# Patient Record
Sex: MALE | Race: WHITE | NOT HISPANIC OR LATINO
[De-identification: names, ages, dates, MRNs, and addresses within clinical notes are randomized per-mention and may not be internally consistent; named-entity substitution may affect disease eponyms.]

---

## 2017-01-18 ENCOUNTER — MEDICATION RENEWAL (OUTPATIENT)
Age: 67
End: 2017-01-18

## 2017-02-22 ENCOUNTER — APPOINTMENT (OUTPATIENT)
Dept: OPHTHALMOLOGY | Facility: CLINIC | Age: 67
End: 2017-02-22

## 2017-08-17 ENCOUNTER — APPOINTMENT (OUTPATIENT)
Dept: UROLOGY | Facility: CLINIC | Age: 67
End: 2017-08-17
Payer: COMMERCIAL

## 2017-08-17 PROCEDURE — 99214 OFFICE O/P EST MOD 30 MIN: CPT

## 2017-08-18 LAB — PSA SERPL-MCNC: <0.01 NG/ML

## 2017-09-15 ENCOUNTER — RX RENEWAL (OUTPATIENT)
Age: 67
End: 2017-09-15

## 2017-10-27 ENCOUNTER — APPOINTMENT (OUTPATIENT)
Dept: GASTROENTEROLOGY | Facility: CLINIC | Age: 67
End: 2017-10-27
Payer: COMMERCIAL

## 2017-10-27 VITALS
TEMPERATURE: 97.2 F | HEIGHT: 66 IN | DIASTOLIC BLOOD PRESSURE: 70 MMHG | SYSTOLIC BLOOD PRESSURE: 120 MMHG | BODY MASS INDEX: 24.43 KG/M2 | WEIGHT: 152 LBS

## 2017-10-27 DIAGNOSIS — K59.00 CONSTIPATION, UNSPECIFIED: ICD-10-CM

## 2017-10-27 PROCEDURE — 99214 OFFICE O/P EST MOD 30 MIN: CPT

## 2017-11-28 ENCOUNTER — APPOINTMENT (OUTPATIENT)
Dept: INTERNAL MEDICINE | Facility: CLINIC | Age: 67
End: 2017-11-28
Payer: COMMERCIAL

## 2017-11-28 VITALS
HEIGHT: 65 IN | WEIGHT: 150 LBS | SYSTOLIC BLOOD PRESSURE: 104 MMHG | HEART RATE: 70 BPM | DIASTOLIC BLOOD PRESSURE: 66 MMHG | BODY MASS INDEX: 24.99 KG/M2 | OXYGEN SATURATION: 98 %

## 2017-11-28 DIAGNOSIS — Z82.0 FAMILY HISTORY OF EPILEPSY AND OTHER DISEASES OF THE NERVOUS SYSTEM: ICD-10-CM

## 2017-11-28 DIAGNOSIS — Z12.11 ENCOUNTER FOR SCREENING FOR MALIGNANT NEOPLASM OF COLON: ICD-10-CM

## 2017-11-28 DIAGNOSIS — Z81.8 FAMILY HISTORY OF OTHER MENTAL AND BEHAVIORAL DISORDERS: ICD-10-CM

## 2017-11-28 DIAGNOSIS — Z87.2 PERSONAL HISTORY OF DISEASES OF THE SKIN AND SUBCUTANEOUS TISSUE: ICD-10-CM

## 2017-11-28 DIAGNOSIS — M79.604 PAIN IN RIGHT LEG: ICD-10-CM

## 2017-11-28 DIAGNOSIS — L72.9 FOLLICULAR CYST OF THE SKIN AND SUBCUTANEOUS TISSUE, UNSPECIFIED: ICD-10-CM

## 2017-11-28 DIAGNOSIS — Z83.3 FAMILY HISTORY OF DIABETES MELLITUS: ICD-10-CM

## 2017-11-28 DIAGNOSIS — Z87.898 PERSONAL HISTORY OF OTHER SPECIFIED CONDITIONS: ICD-10-CM

## 2017-11-28 PROCEDURE — 99387 INIT PM E/M NEW PAT 65+ YRS: CPT | Mod: 25

## 2017-11-28 PROCEDURE — 90471 IMMUNIZATION ADMIN: CPT

## 2017-11-28 PROCEDURE — 90715 TDAP VACCINE 7 YRS/> IM: CPT

## 2017-11-28 RX ORDER — BACILLUS COAGULANS/INULIN 1B-250 MG
CAPSULE ORAL
Refills: 0 | Status: ACTIVE | COMMUNITY

## 2017-12-01 ENCOUNTER — MOBILE ON CALL (OUTPATIENT)
Age: 67
End: 2017-12-01

## 2017-12-04 ENCOUNTER — APPOINTMENT (OUTPATIENT)
Dept: INTERNAL MEDICINE | Facility: CLINIC | Age: 67
End: 2017-12-04
Payer: SELF-PAY

## 2017-12-04 DIAGNOSIS — Z71.89 OTHER SPECIFIED COUNSELING: ICD-10-CM

## 2017-12-04 PROCEDURE — 90632 HEPA VACCINE ADULT IM: CPT

## 2017-12-04 PROCEDURE — 90471 IMMUNIZATION ADMIN: CPT

## 2017-12-04 PROCEDURE — 99401 PREV MED CNSL INDIV APPRX 15: CPT | Mod: 25

## 2017-12-04 RX ORDER — AZITHROMYCIN 250 MG/1
250 TABLET, FILM COATED ORAL
Qty: 6 | Refills: 0 | Status: COMPLETED | COMMUNITY
Start: 2017-12-04 | End: 1900-01-01

## 2017-12-14 LAB
25(OH)D3 SERPL-MCNC: 22.8 NG/ML
ALBUMIN SERPL ELPH-MCNC: 4.6 G/DL
ALP BLD-CCNC: 46 U/L
ALT SERPL-CCNC: 30 U/L
ANION GAP SERPL CALC-SCNC: 13 MMOL/L
AST SERPL-CCNC: 35 U/L
BASOPHILS # BLD AUTO: 0.01 K/UL
BASOPHILS NFR BLD AUTO: 0.2 %
BILIRUB SERPL-MCNC: 0.5 MG/DL
BUN SERPL-MCNC: 16 MG/DL
CALCIUM SERPL-MCNC: 9.9 MG/DL
CHLORIDE SERPL-SCNC: 99 MMOL/L
CHOLEST SERPL-MCNC: 142 MG/DL
CHOLEST/HDLC SERPL: 2.7 RATIO
CO2 SERPL-SCNC: 26 MMOL/L
CREAT SERPL-MCNC: 1.18 MG/DL
EOSINOPHIL # BLD AUTO: 0.21 K/UL
EOSINOPHIL NFR BLD AUTO: 4.5
GLUCOSE SERPL-MCNC: 126 MG/DL
HBA1C MFR BLD HPLC: 5.6 %
HCT VFR BLD CALC: 44 %
HCV AB SER QL: NONREACTIVE
HCV S/CO RATIO: 0.13 S/CO
HDLC SERPL-MCNC: 52 MG/DL
HGB BLD-MCNC: 14.1 G/DL
IMM GRANULOCYTES NFR BLD AUTO: 0.2 %
LDLC SERPL CALC-MCNC: 77 MG/DL
LYMPHOCYTES # BLD AUTO: 1.12 K/UL
LYMPHOCYTES NFR BLD AUTO: 24 %
MAN DIFF?: NORMAL
MCHC RBC-ENTMCNC: 31.9 PG
MCHC RBC-ENTMCNC: 32 GM/DL
MCV RBC AUTO: 99.5 FL
MONOCYTES # BLD AUTO: 0.38 K/UL
MONOCYTES NFR BLD AUTO: 8.1 %
NEUTROPHILS # BLD AUTO: 2.94 K/UL
NEUTROPHILS NFR BLD AUTO: 63 %
PLATELET # BLD AUTO: 181 K/UL
POTASSIUM SERPL-SCNC: 4.8 MMOL/L
PROT SERPL-MCNC: 7.8 G/DL
RBC # BLD: 4.42 M/UL
RBC # FLD: 13.4 %
SODIUM SERPL-SCNC: 138 MMOL/L
TRIGL SERPL-MCNC: 66 MG/DL
WBC # FLD AUTO: 4.67 K/UL

## 2018-01-09 ENCOUNTER — RX RENEWAL (OUTPATIENT)
Age: 68
End: 2018-01-09

## 2018-01-10 ENCOUNTER — APPOINTMENT (OUTPATIENT)
Dept: GASTROENTEROLOGY | Facility: CLINIC | Age: 68
End: 2018-01-10

## 2018-03-08 ENCOUNTER — TRANSCRIPTION ENCOUNTER (OUTPATIENT)
Age: 68
End: 2018-03-08

## 2018-03-09 ENCOUNTER — TRANSCRIPTION ENCOUNTER (OUTPATIENT)
Age: 68
End: 2018-03-09

## 2018-03-12 ENCOUNTER — APPOINTMENT (OUTPATIENT)
Dept: GASTROENTEROLOGY | Facility: CLINIC | Age: 68
End: 2018-03-12
Payer: COMMERCIAL

## 2018-03-12 VITALS
TEMPERATURE: 97.5 F | HEIGHT: 65 IN | OXYGEN SATURATION: 97 % | HEART RATE: 63 BPM | WEIGHT: 153 LBS | DIASTOLIC BLOOD PRESSURE: 62 MMHG | RESPIRATION RATE: 14 BRPM | SYSTOLIC BLOOD PRESSURE: 101 MMHG | BODY MASS INDEX: 25.49 KG/M2

## 2018-03-12 PROCEDURE — 99203 OFFICE O/P NEW LOW 30 MIN: CPT

## 2018-03-22 ENCOUNTER — APPOINTMENT (OUTPATIENT)
Dept: INTERNAL MEDICINE | Facility: CLINIC | Age: 68
End: 2018-03-22
Payer: COMMERCIAL

## 2018-03-22 PROCEDURE — 90750 HZV VACC RECOMBINANT IM: CPT

## 2018-03-22 PROCEDURE — 90471 IMMUNIZATION ADMIN: CPT

## 2018-04-13 ENCOUNTER — APPOINTMENT (OUTPATIENT)
Dept: DERMATOLOGY | Facility: CLINIC | Age: 68
End: 2018-04-13
Payer: COMMERCIAL

## 2018-04-13 ENCOUNTER — MOBILE ON CALL (OUTPATIENT)
Age: 68
End: 2018-04-13

## 2018-04-13 VITALS — SYSTOLIC BLOOD PRESSURE: 120 MMHG | DIASTOLIC BLOOD PRESSURE: 76 MMHG

## 2018-04-13 PROCEDURE — 17000 DESTRUCT PREMALG LESION: CPT

## 2018-04-13 PROCEDURE — 99214 OFFICE O/P EST MOD 30 MIN: CPT | Mod: 25

## 2018-05-21 ENCOUNTER — CHART COPY (OUTPATIENT)
Age: 68
End: 2018-05-21

## 2018-05-22 ENCOUNTER — APPOINTMENT (OUTPATIENT)
Dept: GASTROENTEROLOGY | Facility: AMBULATORY MEDICAL SERVICES | Age: 68
End: 2018-05-22
Payer: COMMERCIAL

## 2018-05-22 PROCEDURE — 45385 COLONOSCOPY W/LESION REMOVAL: CPT

## 2018-05-29 ENCOUNTER — APPOINTMENT (OUTPATIENT)
Dept: INTERNAL MEDICINE | Facility: CLINIC | Age: 68
End: 2018-05-29
Payer: COMMERCIAL

## 2018-05-29 PROCEDURE — 90471 IMMUNIZATION ADMIN: CPT

## 2018-05-29 PROCEDURE — 90750 HZV VACC RECOMBINANT IM: CPT

## 2018-05-30 ENCOUNTER — RX RENEWAL (OUTPATIENT)
Age: 68
End: 2018-05-30

## 2018-06-08 ENCOUNTER — CHART COPY (OUTPATIENT)
Age: 68
End: 2018-06-08

## 2018-06-08 ENCOUNTER — APPOINTMENT (OUTPATIENT)
Dept: DERMATOLOGY | Facility: CLINIC | Age: 68
End: 2018-06-08
Payer: COMMERCIAL

## 2018-06-08 VITALS — SYSTOLIC BLOOD PRESSURE: 130 MMHG | DIASTOLIC BLOOD PRESSURE: 80 MMHG

## 2018-06-08 DIAGNOSIS — R22.9 LOCALIZED SWELLING, MASS AND LUMP, UNSPECIFIED: ICD-10-CM

## 2018-06-08 PROCEDURE — 99213 OFFICE O/P EST LOW 20 MIN: CPT

## 2018-07-10 ENCOUNTER — RX RENEWAL (OUTPATIENT)
Age: 68
End: 2018-07-10

## 2018-07-18 ENCOUNTER — MEDICATION RENEWAL (OUTPATIENT)
Age: 68
End: 2018-07-18

## 2018-07-27 PROBLEM — Z12.11 ENCOUNTER FOR SCREENING FOR MALIGNANT NEOPLASM OF COLON: Status: RESOLVED | Noted: 2017-10-27 | Resolved: 2018-07-27

## 2018-08-28 ENCOUNTER — APPOINTMENT (OUTPATIENT)
Dept: UROLOGY | Facility: CLINIC | Age: 68
End: 2018-08-28
Payer: COMMERCIAL

## 2018-08-28 PROCEDURE — 99214 OFFICE O/P EST MOD 30 MIN: CPT

## 2018-08-29 LAB — PSA SERPL-MCNC: <0.01 NG/ML

## 2018-09-21 ENCOUNTER — TRANSCRIPTION ENCOUNTER (OUTPATIENT)
Age: 68
End: 2018-09-21

## 2018-10-23 ENCOUNTER — APPOINTMENT (OUTPATIENT)
Dept: OPHTHALMOLOGY | Facility: CLINIC | Age: 68
End: 2018-10-23
Payer: COMMERCIAL

## 2018-10-23 PROCEDURE — 92014 COMPRE OPH EXAM EST PT 1/>: CPT

## 2018-12-07 ENCOUNTER — APPOINTMENT (OUTPATIENT)
Dept: INTERNAL MEDICINE | Facility: CLINIC | Age: 68
End: 2018-12-07
Payer: COMMERCIAL

## 2018-12-07 VITALS
SYSTOLIC BLOOD PRESSURE: 120 MMHG | HEIGHT: 65 IN | WEIGHT: 152 LBS | OXYGEN SATURATION: 98 % | HEART RATE: 72 BPM | DIASTOLIC BLOOD PRESSURE: 68 MMHG | BODY MASS INDEX: 25.33 KG/M2

## 2018-12-07 DIAGNOSIS — Z12.11 ENCOUNTER FOR SCREENING FOR MALIGNANT NEOPLASM OF COLON: ICD-10-CM

## 2018-12-07 PROCEDURE — 90471 IMMUNIZATION ADMIN: CPT

## 2018-12-07 PROCEDURE — 99397 PER PM REEVAL EST PAT 65+ YR: CPT | Mod: 25

## 2018-12-07 PROCEDURE — G0442 ANNUAL ALCOHOL SCREEN 15 MIN: CPT

## 2018-12-07 PROCEDURE — G0444 DEPRESSION SCREEN ANNUAL: CPT

## 2018-12-07 PROCEDURE — 90691 TYPHOID VACCINE IM: CPT

## 2018-12-07 RX ORDER — SODIUM SULFATE, POTASSIUM SULFATE, MAGNESIUM SULFATE 17.5; 3.13; 1.6 G/ML; G/ML; G/ML
17.5-3.13-1.6 SOLUTION, CONCENTRATE ORAL
Qty: 1 | Refills: 0 | Status: DISCONTINUED | COMMUNITY
Start: 2018-03-12 | End: 2018-12-07

## 2018-12-13 ENCOUNTER — MEDICATION RENEWAL (OUTPATIENT)
Age: 68
End: 2018-12-13

## 2018-12-22 ENCOUNTER — TRANSCRIPTION ENCOUNTER (OUTPATIENT)
Age: 68
End: 2018-12-22

## 2018-12-22 LAB
25(OH)D3 SERPL-MCNC: 55.2 NG/ML
ALBUMIN SERPL ELPH-MCNC: 4.9 G/DL
ALP BLD-CCNC: 45 U/L
ALT SERPL-CCNC: 27 U/L
ANION GAP SERPL CALC-SCNC: 11 MMOL/L
AST SERPL-CCNC: 27 U/L
BASOPHILS # BLD AUTO: 0.02 K/UL
BASOPHILS NFR BLD AUTO: 0.4 %
BILIRUB SERPL-MCNC: 0.4 MG/DL
BUN SERPL-MCNC: 16 MG/DL
CALCIUM SERPL-MCNC: 9.6 MG/DL
CHLORIDE SERPL-SCNC: 103 MMOL/L
CHOLEST SERPL-MCNC: 131 MG/DL
CHOLEST/HDLC SERPL: 2.8 RATIO
CO2 SERPL-SCNC: 27 MMOL/L
CREAT SERPL-MCNC: 1.1 MG/DL
EOSINOPHIL # BLD AUTO: 0.18 K/UL
EOSINOPHIL NFR BLD AUTO: 3.2 %
GLUCOSE SERPL-MCNC: 103 MG/DL
HBA1C MFR BLD HPLC: 5.7 %
HBV SURFACE AB SER QL: ABNORMAL
HCT VFR BLD CALC: 41.7 %
HDLC SERPL-MCNC: 47 MG/DL
HGB BLD-MCNC: 13.9 G/DL
IMM GRANULOCYTES NFR BLD AUTO: 0 %
LDLC SERPL CALC-MCNC: 71 MG/DL
LYMPHOCYTES # BLD AUTO: 1.12 K/UL
LYMPHOCYTES NFR BLD AUTO: 19.9 %
MAN DIFF?: NORMAL
MCHC RBC-ENTMCNC: 31.7 PG
MCHC RBC-ENTMCNC: 33.3 GM/DL
MCV RBC AUTO: 95.2 FL
MONOCYTES # BLD AUTO: 0.44 K/UL
MONOCYTES NFR BLD AUTO: 7.8 %
NEUTROPHILS # BLD AUTO: 3.87 K/UL
NEUTROPHILS NFR BLD AUTO: 68.7 %
PLATELET # BLD AUTO: 202 K/UL
POTASSIUM SERPL-SCNC: 4.8 MMOL/L
PROT SERPL-MCNC: 7.3 G/DL
RBC # BLD: 4.38 M/UL
RBC # FLD: 13.2 %
SODIUM SERPL-SCNC: 141 MMOL/L
TRIGL SERPL-MCNC: 63 MG/DL
WBC # FLD AUTO: 5.63 K/UL

## 2018-12-22 NOTE — ASSESSMENT
[FreeTextEntry1] : 69 yo man with h/o as above including hyperlipidemia, prostate cancer s/p prostatectomy and radiation, preDM, constipation, here for CPE.\par 1.  CV - bp at goal, check lipids\par 2.  Endo - check hgbA1c for preDM\par 3.   - follows with  for h/o prostate cancer; to try to change formulation back of tadalafil but if no improvement will discuss with ; check UA given urinary symptoms\par 4.  GI - colonoscopy utd\par 5.  ID - typhoid vaccine given as due for upcoming travel; check hep B titer to make sure immune\par 6.  HCM - check other below labs; vaccines otherwise utd\par 7.  RTO prn or 1 year

## 2018-12-22 NOTE — REVIEW OF SYSTEMS
[Negative] : Musculoskeletal [Fever] : no fever [Chills] : no chills [Fatigue] : no fatigue [Recent Change In Weight] : ~T no recent weight change [Chest Pain] : no chest pain [Palpitations] : no palpitations [Shortness Of Breath] : no shortness of breath [Cough] : no cough [Abdominal Pain] : no abdominal pain [Nausea] : no nausea [Constipation] : no constipation [Diarrhea] : no diarrhea [Vomiting] : no vomiting [Heartburn] : no heartburn [Melena] : no melena [Dysuria] : no dysuria [Nocturia] : no nocturia [Skin Rash] : no skin rash [Dizziness] : no dizziness [Fainting] : no fainting [Anxiety] : no anxiety [Depression] : no depression [Easy Bleeding] : no easy bleeding [Easy Bruising] : no easy bruising [Swollen Glands] : no swollen glands [FreeTextEntry3] : sees optho [FreeTextEntry6] : few weeks ago couldn't take deep breath, went away [FreeTextEntry8] : nocturia better than it has been, slight pain with urination at times, transient [FreeTextEntry9] : muscle aches with seeing

## 2018-12-22 NOTE — PHYSICAL EXAM
[No Acute Distress] : no acute distress [Well Nourished] : well nourished [Well Developed] : well developed [Well-Appearing] : well-appearing [PERRL] : pupils equal round and reactive to light [Normal Oropharynx] : the oropharynx was normal [Normal TMs] : both tympanic membranes were normal [Normal Nasal Mucosa] : the nasal mucosa was normal [Supple] : supple [No Lymphadenopathy] : no lymphadenopathy [Thyroid Normal, No Nodules] : the thyroid was normal and there were no nodules present [No Respiratory Distress] : no respiratory distress  [Clear to Auscultation] : lungs were clear to auscultation bilaterally [No Accessory Muscle Use] : no accessory muscle use [Normal Rate] : normal rate  [Regular Rhythm] : with a regular rhythm [Normal S1, S2] : normal S1 and S2 [No Murmur] : no murmur heard [No Carotid Bruits] : no carotid bruits [Pedal Pulses Present] : the pedal pulses are present [No Edema] : there was no peripheral edema [Normal Appearance] : normal in appearance [Soft] : abdomen soft [Non Tender] : non-tender [Non-distended] : non-distended [No Masses] : no abdominal mass palpated [No HSM] : no HSM [Normal Bowel Sounds] : normal bowel sounds [Normal Supraclavicular Nodes] : no supraclavicular lymphadenopathy [Normal Posterior Cervical Nodes] : no posterior cervical lymphadenopathy [Normal Anterior Cervical Nodes] : no anterior cervical lymphadenopathy [No Joint Swelling] : no joint swelling [No Rash] : no rash [Normal Gait] : normal gait [Normal Affect] : the affect was normal [de-identified] : done by NAILA

## 2018-12-22 NOTE — HISTORY OF PRESENT ILLNESS
[FreeTextEntry1] : physical [de-identified] : 67 yo man with h/o as below here for CPE.\par Feeling well overall, no complaints.\par Will be traveling to [facility] in 2 weeks.\par Notes tadalafil not as effective as was previously, has had difficulty with erections over past few weeks.  Used to get medication in pill form from [facility], then switched to pharmacy in  and in chewable tablet form.\par No other active issues.

## 2018-12-22 NOTE — ADDENDUM
[FreeTextEntry1] : 12/22/18:  Reviewed labs, nl except hgbA1c 5.7 c/w preDM (to work on diet, stable), hep B indeterminate (consider repeating), UA unable to be processed (advised if persistent urinary symptoms to call back and we would send another urine sample) - messaged to pt.

## 2019-01-09 ENCOUNTER — RX RENEWAL (OUTPATIENT)
Age: 69
End: 2019-01-09

## 2019-01-10 ENCOUNTER — RX RENEWAL (OUTPATIENT)
Age: 69
End: 2019-01-10

## 2019-01-16 ENCOUNTER — TRANSCRIPTION ENCOUNTER (OUTPATIENT)
Age: 69
End: 2019-01-16

## 2019-01-22 ENCOUNTER — TRANSCRIPTION ENCOUNTER (OUTPATIENT)
Age: 69
End: 2019-01-22

## 2019-03-06 ENCOUNTER — APPOINTMENT (OUTPATIENT)
Dept: UROLOGY | Facility: CLINIC | Age: 69
End: 2019-03-06
Payer: COMMERCIAL

## 2019-03-06 VITALS
SYSTOLIC BLOOD PRESSURE: 141 MMHG | OXYGEN SATURATION: 97 % | HEIGHT: 65 IN | TEMPERATURE: 98 F | BODY MASS INDEX: 25.33 KG/M2 | DIASTOLIC BLOOD PRESSURE: 74 MMHG | WEIGHT: 152 LBS | HEART RATE: 75 BPM

## 2019-03-06 PROCEDURE — 99212 OFFICE O/P EST SF 10 MIN: CPT

## 2019-03-06 NOTE — HISTORY OF PRESENT ILLNESS
[FreeTextEntry1] : CC: ED\par \par Patient with ED, was doing well with generic 5 mg daily tadalafil.  Switched supplier and less effective, then went back to the original and not as effective as used to be. \par \par We discussed taking 20 mg prior to intercourse, and then next day holding the 5mg, then resuming 5 mg daily, and using 20 in this manner when needed. \par \par Other options discussed in detail

## 2019-03-07 ENCOUNTER — TRANSCRIPTION ENCOUNTER (OUTPATIENT)
Age: 69
End: 2019-03-07

## 2019-03-07 LAB — PSA SERPL-MCNC: <0.01 NG/ML

## 2019-04-05 ENCOUNTER — APPOINTMENT (OUTPATIENT)
Dept: DERMATOLOGY | Facility: CLINIC | Age: 69
End: 2019-04-05
Payer: COMMERCIAL

## 2019-04-05 PROCEDURE — 17003 DESTRUCT PREMALG LES 2-14: CPT

## 2019-04-05 PROCEDURE — 17000 DESTRUCT PREMALG LESION: CPT

## 2019-04-05 PROCEDURE — 99214 OFFICE O/P EST MOD 30 MIN: CPT | Mod: 25

## 2019-05-28 ENCOUNTER — RX RENEWAL (OUTPATIENT)
Age: 69
End: 2019-05-28

## 2019-06-23 ENCOUNTER — TRANSCRIPTION ENCOUNTER (OUTPATIENT)
Age: 69
End: 2019-06-23

## 2019-07-08 ENCOUNTER — RX RENEWAL (OUTPATIENT)
Age: 69
End: 2019-07-08

## 2019-07-17 ENCOUNTER — APPOINTMENT (OUTPATIENT)
Dept: OPHTHALMOLOGY | Facility: CLINIC | Age: 69
End: 2019-07-17
Payer: SELF-PAY

## 2019-07-17 ENCOUNTER — NON-APPOINTMENT (OUTPATIENT)
Age: 69
End: 2019-07-17

## 2019-07-17 PROCEDURE — 92015 DETERMINE REFRACTIVE STATE: CPT

## 2019-08-26 ENCOUNTER — APPOINTMENT (OUTPATIENT)
Dept: DERMATOLOGY | Facility: CLINIC | Age: 69
End: 2019-08-26
Payer: COMMERCIAL

## 2019-08-26 PROCEDURE — 99213 OFFICE O/P EST LOW 20 MIN: CPT

## 2019-09-13 ENCOUNTER — APPOINTMENT (OUTPATIENT)
Dept: UROLOGY | Facility: CLINIC | Age: 69
End: 2019-09-13

## 2019-09-17 ENCOUNTER — LABORATORY RESULT (OUTPATIENT)
Age: 69
End: 2019-09-17

## 2019-09-18 ENCOUNTER — APPOINTMENT (OUTPATIENT)
Dept: UROLOGY | Facility: CLINIC | Age: 69
End: 2019-09-18
Payer: COMMERCIAL

## 2019-09-18 PROCEDURE — 99214 OFFICE O/P EST MOD 30 MIN: CPT

## 2019-09-18 NOTE — HISTORY OF PRESENT ILLNESS
[FreeTextEntry1] : CC: personal history of CaP, LUTS, urethral stricture, ED\par \par History of RRP in 2003 in [place] with Dr. Hutson, then BCR and XRT in 2008.  He has a history of radiation cystitis, bulbar urethral stricture, ED.   \par Currently doing well.  No pain.  Trace occasional ONOFRE.  Excellent erections with 5 mg tadalafil, sometimes needing 10mg.  No other issues.  No hematuria. \par \par SURG: RRP \par FAMHX: negative for CaP\par SOCIAL nonsmoker\par ROS: negative CV,RESP,GI

## 2019-09-18 NOTE — PHYSICAL EXAM
[General Appearance - Well Developed] : well developed [General Appearance - Well Nourished] : well nourished [Normal Appearance] : normal appearance [Well Groomed] : well groomed [General Appearance - In No Acute Distress] : no acute distress [Abdomen Soft] : soft [Abdomen Tenderness] : non-tender [Costovertebral Angle Tenderness] : no ~M costovertebral angle tenderness [Urethral Meatus] : meatus normal [Urinary Bladder Findings] : the bladder was normal on palpation [Penis Abnormality] : normal circumcised penis [Epididymis] : the epididymides were normal [Scrotum] : the scrotum was normal [Testes Tenderness] : no tenderness of the testes [Testes Mass (___cm)] : there were no testicular masses [Edema] : no peripheral edema [Respiration, Rhythm And Depth] : normal respiratory rhythm and effort [] : no respiratory distress [Exaggerated Use Of Accessory Muscles For Inspiration] : no accessory muscle use [No Focal Deficits] : no focal deficits [No Palpable Adenopathy] : no palpable adenopathy

## 2019-10-30 ENCOUNTER — NON-APPOINTMENT (OUTPATIENT)
Age: 69
End: 2019-10-30

## 2019-10-30 ENCOUNTER — APPOINTMENT (OUTPATIENT)
Dept: OPHTHALMOLOGY | Facility: CLINIC | Age: 69
End: 2019-10-30
Payer: COMMERCIAL

## 2019-10-30 PROCEDURE — 92014 COMPRE OPH EXAM EST PT 1/>: CPT

## 2020-01-14 ENCOUNTER — APPOINTMENT (OUTPATIENT)
Dept: INTERNAL MEDICINE | Facility: CLINIC | Age: 70
End: 2020-01-14
Payer: COMMERCIAL

## 2020-01-14 VITALS
BODY MASS INDEX: 24.66 KG/M2 | HEIGHT: 65 IN | DIASTOLIC BLOOD PRESSURE: 70 MMHG | OXYGEN SATURATION: 90 % | SYSTOLIC BLOOD PRESSURE: 130 MMHG | WEIGHT: 148 LBS | HEART RATE: 60 BPM

## 2020-01-14 DIAGNOSIS — Z86.018 PERSONAL HISTORY OF OTHER BENIGN NEOPLASM: ICD-10-CM

## 2020-01-14 DIAGNOSIS — H02.889 MEIBOMIAN GLAND DYSFUNCTION OF UNSPECIFIED EYE, UNSPECIFIED EYELID: ICD-10-CM

## 2020-01-14 DIAGNOSIS — Z71.89 OTHER SPECIFIED COUNSELING: ICD-10-CM

## 2020-01-14 PROCEDURE — 99397 PER PM REEVAL EST PAT 65+ YR: CPT

## 2020-01-14 RX ORDER — TADALAFIL 5 MG/1
5 TABLET ORAL
Qty: 90 | Refills: 0 | Status: DISCONTINUED | COMMUNITY
Start: 2019-09-18 | End: 2020-01-14

## 2020-01-14 RX ORDER — FLUOROURACIL 50 MG/G
5 CREAM TOPICAL
Qty: 1 | Refills: 1 | Status: DISCONTINUED | COMMUNITY
Start: 2019-04-05 | End: 2020-01-14

## 2020-01-14 RX ORDER — SOLIFENACIN SUCCINATE 10 MG/1
10 TABLET ORAL
Qty: 30 | Refills: 5 | Status: DISCONTINUED | COMMUNITY
Start: 2019-07-08 | End: 2020-01-14

## 2020-01-22 ENCOUNTER — TRANSCRIPTION ENCOUNTER (OUTPATIENT)
Age: 70
End: 2020-01-22

## 2020-01-22 LAB
25(OH)D3 SERPL-MCNC: 40.6 NG/ML
ALBUMIN SERPL ELPH-MCNC: 5.1 G/DL
ALP BLD-CCNC: 55 U/L
ALT SERPL-CCNC: 26 U/L
ANION GAP SERPL CALC-SCNC: 10 MMOL/L
AST SERPL-CCNC: 30 U/L
BASOPHILS # BLD AUTO: 0.02 K/UL
BASOPHILS NFR BLD AUTO: 0.4 %
BILIRUB SERPL-MCNC: 0.5 MG/DL
BUN SERPL-MCNC: 12 MG/DL
CALCIUM SERPL-MCNC: 10.2 MG/DL
CHLORIDE SERPL-SCNC: 101 MMOL/L
CHOLEST SERPL-MCNC: 142 MG/DL
CHOLEST/HDLC SERPL: 2.6 RATIO
CO2 SERPL-SCNC: 29 MMOL/L
CREAT SERPL-MCNC: 1.04 MG/DL
EOSINOPHIL # BLD AUTO: 0.13 K/UL
EOSINOPHIL NFR BLD AUTO: 2.5 %
ESTIMATED AVERAGE GLUCOSE: 108 MG/DL
GLUCOSE SERPL-MCNC: 92 MG/DL
HBA1C MFR BLD HPLC: 5.4 %
HCT VFR BLD CALC: 44.4 %
HDLC SERPL-MCNC: 54 MG/DL
HGB BLD-MCNC: 14.4 G/DL
IMM GRANULOCYTES NFR BLD AUTO: 0.6 %
LDLC SERPL CALC-MCNC: 76 MG/DL
LYMPHOCYTES # BLD AUTO: 1.09 K/UL
LYMPHOCYTES NFR BLD AUTO: 20.8 %
MAN DIFF?: NORMAL
MCHC RBC-ENTMCNC: 31.5 PG
MCHC RBC-ENTMCNC: 32.4 GM/DL
MCV RBC AUTO: 97.2 FL
MONOCYTES # BLD AUTO: 0.43 K/UL
MONOCYTES NFR BLD AUTO: 8.2 %
NEUTROPHILS # BLD AUTO: 3.55 K/UL
NEUTROPHILS NFR BLD AUTO: 67.5 %
PLATELET # BLD AUTO: 172 K/UL
POTASSIUM SERPL-SCNC: 4.7 MMOL/L
PROT SERPL-MCNC: 7.2 G/DL
RBC # BLD: 4.57 M/UL
RBC # FLD: 13.2 %
SODIUM SERPL-SCNC: 140 MMOL/L
TRIGL SERPL-MCNC: 61 MG/DL
WBC # FLD AUTO: 5.25 K/UL

## 2020-01-22 NOTE — PHYSICAL EXAM
[No Acute Distress] : no acute distress [Well Nourished] : well nourished [Well-Appearing] : well-appearing [Well Developed] : well developed [PERRL] : pupils equal round and reactive to light [Normal Oropharynx] : the oropharynx was normal [No Lymphadenopathy] : no lymphadenopathy [Normal TMs] : both tympanic membranes were normal [Thyroid Normal, No Nodules] : the thyroid was normal and there were no nodules present [Supple] : supple [No Respiratory Distress] : no respiratory distress  [No Accessory Muscle Use] : no accessory muscle use [Clear to Auscultation] : lungs were clear to auscultation bilaterally [Normal Rate] : normal rate  [Normal S1, S2] : normal S1 and S2 [Regular Rhythm] : with a regular rhythm [No Carotid Bruits] : no carotid bruits [No Murmur] : no murmur heard [Pedal Pulses Present] : the pedal pulses are present [No Edema] : there was no peripheral edema [Soft] : abdomen soft [Normal Appearance] : normal in appearance [Non-distended] : non-distended [Non Tender] : non-tender [No HSM] : no HSM [Normal Bowel Sounds] : normal bowel sounds [No Masses] : no abdominal mass palpated [Normal Posterior Cervical Nodes] : no posterior cervical lymphadenopathy [Normal Supraclavicular Nodes] : no supraclavicular lymphadenopathy [Normal Anterior Cervical Nodes] : no anterior cervical lymphadenopathy [No Joint Swelling] : no joint swelling [No Rash] : no rash [Normal Gait] : normal gait [Normal Affect] : the affect was normal [de-identified] : crowded posterior oropharynx [de-identified] : done by NAILA

## 2020-01-22 NOTE — ASSESSMENT
[FreeTextEntry1] : 70 yo man with h/o as above including hyperlipidemia, preDM, prostate cancer s/p prostatectomy, ED, here for CPE.\par 1.  CV - bp at goal, check lipids\par 2.  ENT - reported snoring, trial flonase, breathe-right strips, changing sleep position, if continues or if waking up unrefreshed consider eval for HANNY\par 3.  GI - colonoscopy utd, due next year\par 4.   - following with  following prostate cancer hx, to discuss ED treatment with  (notes sometimes higher prn dosing works better, pt will ask  how to transition from regular use to prn use intermittently)\par 5.  HCM - check below labs; vaccines utd\par 6.  RTO prn or 1 year

## 2020-01-22 NOTE — HISTORY OF PRESENT ILLNESS
[FreeTextEntry1] : physical [de-identified] : 68 yo man with h/o as below here for CPE.\par Would like to go back and see dermatologist again, skin is pruritic and had precancerous lesions.\par Sometimes snores per wife.  \par Has dropped some weight, lost about 8 lbs, then gained 4 lbs back.  Has been exercising, might not have been eating as much.  Will have fruit without oatmeal in the morning (used to have oatmeal in the morning), now walking 2 miles/day as well to get to work.  Also sees  twice/week, does elliptical as well.\par No other active issues.

## 2020-01-22 NOTE — REVIEW OF SYSTEMS
[Recent Change In Weight] : ~T recent weight change [Impotence] : impotence [Negative] : Musculoskeletal [Fever] : no fever [Chills] : no chills [Fatigue] : no fatigue [Chest Pain] : no chest pain [Palpitations] : no palpitations [Shortness Of Breath] : no shortness of breath [Cough] : no cough [Dyspnea on Exertion] : not dyspnea on exertion [Abdominal Pain] : no abdominal pain [Nausea] : no nausea [Constipation] : no constipation [Diarrhea] : no diarrhea [Vomiting] : no vomiting [Heartburn] : no heartburn [Melena] : no melena [Dysuria] : no dysuria [Nocturia] : no nocturia [Dizziness] : no dizziness [Fainting] : no fainting [Anxiety] : no anxiety [Depression] : no depression [Easy Bleeding] : no easy bleeding [Easy Bruising] : no easy bruising [Swollen Glands] : no swollen glands [FreeTextEntry3] : sees optho [FreeTextEntry7] : slight bleeding with bowel movement (with wiping) [FreeTextEntry8] : nocturia once/night [de-identified] : see hpi

## 2020-05-21 ENCOUNTER — TRANSCRIPTION ENCOUNTER (OUTPATIENT)
Age: 70
End: 2020-05-21

## 2020-05-29 ENCOUNTER — RX RENEWAL (OUTPATIENT)
Age: 70
End: 2020-05-29

## 2020-08-14 ENCOUNTER — APPOINTMENT (OUTPATIENT)
Dept: DERMATOLOGY | Facility: CLINIC | Age: 70
End: 2020-08-14

## 2020-08-17 ENCOUNTER — APPOINTMENT (OUTPATIENT)
Dept: DERMATOLOGY | Facility: CLINIC | Age: 70
End: 2020-08-17
Payer: COMMERCIAL

## 2020-08-17 VITALS — WEIGHT: 149 LBS | BODY MASS INDEX: 24.83 KG/M2 | HEIGHT: 65 IN

## 2020-08-17 VITALS — TEMPERATURE: 97.7 F

## 2020-08-17 PROCEDURE — 17000 DESTRUCT PREMALG LESION: CPT

## 2020-08-17 PROCEDURE — 99213 OFFICE O/P EST LOW 20 MIN: CPT | Mod: 25

## 2020-08-17 PROCEDURE — 17003 DESTRUCT PREMALG LES 2-14: CPT

## 2020-08-31 ENCOUNTER — APPOINTMENT (OUTPATIENT)
Dept: GASTROENTEROLOGY | Facility: CLINIC | Age: 70
End: 2020-08-31
Payer: COMMERCIAL

## 2020-08-31 DIAGNOSIS — Z87.19 PERSONAL HISTORY OF OTHER DISEASES OF THE DIGESTIVE SYSTEM: ICD-10-CM

## 2020-08-31 PROCEDURE — 99214 OFFICE O/P EST MOD 30 MIN: CPT | Mod: 95

## 2020-08-31 NOTE — ASSESSMENT
[FreeTextEntry1] : This is a 70-year-old man status post radiation therapy over 10 years ago for prostate cancer now with rectal bleeding most likely due to worsening radiation proctitis. He also has a history of adenomatous colon polyp at colonoscopy 2 years ago. I explained to him at length the meaning of radiation proctitis and available treatment for radiation proctitis which includes colonoscopy with argon plasma coagulation. I explained to him the risks, alternatives and benefits of the procedure. Risk including but not limited to bleeding, perforation, infection adverse medication reaction. Multiple questions were answered. He stated understanding.

## 2020-08-31 NOTE — HISTORY OF PRESENT ILLNESS
[Home] : at home, [unfilled] , at the time of the visit. [Other Location: e.g. Home (Enter Location, City,State)___] : at [unfilled] [Verbal consent obtained from patient] : the patient, [unfilled] [FreeTextEntry1] : This is a 70-year-old man with history of prostate cancer status post radiation over 10 years ago reporting more frequent rectal bleeding consisting of bright blood blood per rectum. He now notices blood stains in his underwear and clothing. He denies rectal pain or discomfort. He denies constipation, having hard stools with straining. No abdominal pain, nausea or vomiting. He takes probiotics with improvement in bowel movements in that he has "good" bowel movements and a daily basis. He denies weight loss. Review recent blood work showing no evidence of anemia. He underwent a colonoscopy in May 2018 with a sessile serrated adenoma and mild radiation proctitis. He was also recently found to have mediations cystitis.

## 2020-09-28 ENCOUNTER — APPOINTMENT (OUTPATIENT)
Dept: DISASTER EMERGENCY | Facility: CLINIC | Age: 70
End: 2020-09-28

## 2020-09-29 ENCOUNTER — APPOINTMENT (OUTPATIENT)
Dept: UROLOGY | Facility: CLINIC | Age: 70
End: 2020-09-29

## 2020-09-29 VITALS — TEMPERATURE: 97.9 F | SYSTOLIC BLOOD PRESSURE: 123 MMHG | DIASTOLIC BLOOD PRESSURE: 66 MMHG

## 2020-09-29 LAB — SARS-COV-2 N GENE NPH QL NAA+PROBE: NOT DETECTED

## 2020-10-01 ENCOUNTER — APPOINTMENT (OUTPATIENT)
Dept: GASTROENTEROLOGY | Facility: HOSPITAL | Age: 70
End: 2020-10-01

## 2020-10-01 ENCOUNTER — OUTPATIENT (OUTPATIENT)
Dept: OUTPATIENT SERVICES | Facility: HOSPITAL | Age: 70
LOS: 1 days | End: 2020-10-01
Payer: COMMERCIAL

## 2020-10-01 ENCOUNTER — RESULT REVIEW (OUTPATIENT)
Age: 70
End: 2020-10-01

## 2020-10-01 VITALS
WEIGHT: 134.92 LBS | TEMPERATURE: 97 F | HEIGHT: 65 IN | SYSTOLIC BLOOD PRESSURE: 129 MMHG | DIASTOLIC BLOOD PRESSURE: 67 MMHG | RESPIRATION RATE: 11 BRPM | HEART RATE: 64 BPM

## 2020-10-01 VITALS
DIASTOLIC BLOOD PRESSURE: 68 MMHG | SYSTOLIC BLOOD PRESSURE: 118 MMHG | HEART RATE: 56 BPM | RESPIRATION RATE: 12 BRPM | OXYGEN SATURATION: 99 %

## 2020-10-01 DIAGNOSIS — Z98.89 OTHER SPECIFIED POSTPROCEDURAL STATES: Chronic | ICD-10-CM

## 2020-10-01 DIAGNOSIS — K62.5 HEMORRHAGE OF ANUS AND RECTUM: ICD-10-CM

## 2020-10-01 DIAGNOSIS — K62.7 RADIATION PROCTITIS: ICD-10-CM

## 2020-10-01 DIAGNOSIS — T14.8 OTHER INJURY OF UNSPECIFIED BODY REGION: Chronic | ICD-10-CM

## 2020-10-01 PROCEDURE — 88305 TISSUE EXAM BY PATHOLOGIST: CPT | Mod: 26

## 2020-10-01 PROCEDURE — 45385 COLONOSCOPY W/LESION REMOVAL: CPT | Mod: GC

## 2020-10-01 PROCEDURE — 88305 TISSUE EXAM BY PATHOLOGIST: CPT

## 2020-10-01 PROCEDURE — 45385 COLONOSCOPY W/LESION REMOVAL: CPT

## 2020-10-01 PROCEDURE — 45382 COLONOSCOPY W/CONTROL BLEED: CPT | Mod: GC,59

## 2020-10-01 RX ORDER — SODIUM CHLORIDE 9 MG/ML
1000 INJECTION INTRAMUSCULAR; INTRAVENOUS; SUBCUTANEOUS
Refills: 0 | Status: DISCONTINUED | OUTPATIENT
Start: 2020-10-01 | End: 2020-10-15

## 2020-10-01 RX ORDER — CHOLECALCIFEROL (VITAMIN D3) 125 MCG
1 CAPSULE ORAL
Qty: 0 | Refills: 0 | DISCHARGE

## 2020-10-01 NOTE — PRE-ANESTHESIA EVALUATION ADULT - NSANTHOSAYNRD_GEN_A_CORE
No. HANNY screening performed.  STOP BANG Legend: 0-2 = LOW Risk; 3-4 = INTERMEDIATE Risk; 5-8 = HIGH Risk

## 2020-10-01 NOTE — PRE PROCEDURE NOTE - PRE PROCEDURE EVALUATION
Attending Physician:             Radha Baldwin               Procedure: colonoscopy    Indication for Procedure: rectal bleeding  ________________________________________________________  PAST MEDICAL & SURGICAL HISTORY:  ED (erectile dysfunction)  cialis daily    Syncope  1993  related to stress    Prostate cancer  s/p prostatectomy 2003/ radiation 2008    Hyperlipidemia    Urethral stricture    S/P hernia repair  left with mesh 1993    S/P prostatectomy  2003    Fracture  right cheekbone s/p orif  1978      ALLERGIES:  No Known Allergies    HOME MEDICATIONS:  atorvastatin 10 mg oral tablet: 1 tab(s) orally once a day  Percocet 5/325 325 mg-5 mg oral tablet: 1 tab(s) orally every 4 hours, As Needed  tadalafil 5 mg oral tablet: 1 tab(s) orally once a day  Zenpep 25,000 units-136,000 units-85,000 units oral delayed release capsule: 1 cap(s) orally 3 times a day    AICD/PPM: [ ] yes   [X ] no    PERTINENT LAB DATA:                      PHYSICAL EXAMINATION:    T(C): --  HR: --  BP: --  RR: --  SpO2: --    Constitutional: NAD  HEENT: PERRLA, EOMI,    Neck:  No JVD  Respiratory: CTAB/L  Cardiovascular: S1 and S2  Gastrointestinal: BS+, soft, NT/ND  Extremities: No peripheral edema  Neurological: A/O x 3, no focal deficits  Psychiatric: Normal mood, normal affect  Skin: No rashes    ASA Class: I [ ]  II [ ]  III [ ]  IV [ ]    COMMENTS:    The patient is a suitable candidate for the planned procedure unless box checked [ ]  No, explain:

## 2020-10-01 NOTE — ASU PATIENT PROFILE, ADULT - PSH
Fracture  right cheekbone s/p orif  1978  S/P hernia repair  left with mesh 1993  S/P prostatectomy  2003

## 2020-10-01 NOTE — ASU PATIENT PROFILE, ADULT - PMH
ED (erectile dysfunction)  cialis daily  Hyperlipidemia    Prostate cancer  s/p prostatectomy 2003/ radiation 2008  Syncope  1993  related to stress  Urethral stricture

## 2020-10-02 LAB — SURGICAL PATHOLOGY STUDY: SIGNIFICANT CHANGE UP

## 2020-10-06 LAB
PSA FREE FLD-MCNC: NORMAL %
PSA FREE SERPL-MCNC: <0.01 NG/ML
PSA SERPL-MCNC: <0.01 NG/ML

## 2020-11-20 ENCOUNTER — TRANSCRIPTION ENCOUNTER (OUTPATIENT)
Age: 70
End: 2020-11-20

## 2020-12-01 ENCOUNTER — TRANSCRIPTION ENCOUNTER (OUTPATIENT)
Age: 70
End: 2020-12-01

## 2020-12-21 ENCOUNTER — TRANSCRIPTION ENCOUNTER (OUTPATIENT)
Age: 70
End: 2020-12-21

## 2021-01-03 ENCOUNTER — TRANSCRIPTION ENCOUNTER (OUTPATIENT)
Age: 71
End: 2021-01-03

## 2021-01-05 ENCOUNTER — TRANSCRIPTION ENCOUNTER (OUTPATIENT)
Age: 71
End: 2021-01-05

## 2021-01-12 ENCOUNTER — TRANSCRIPTION ENCOUNTER (OUTPATIENT)
Age: 71
End: 2021-01-12

## 2021-01-14 ENCOUNTER — TRANSCRIPTION ENCOUNTER (OUTPATIENT)
Age: 71
End: 2021-01-14

## 2021-01-25 ENCOUNTER — TRANSCRIPTION ENCOUNTER (OUTPATIENT)
Age: 71
End: 2021-01-25

## 2021-01-26 ENCOUNTER — TRANSCRIPTION ENCOUNTER (OUTPATIENT)
Age: 71
End: 2021-01-26

## 2021-01-28 ENCOUNTER — TRANSCRIPTION ENCOUNTER (OUTPATIENT)
Age: 71
End: 2021-01-28

## 2021-01-29 ENCOUNTER — TRANSCRIPTION ENCOUNTER (OUTPATIENT)
Age: 71
End: 2021-01-29

## 2021-01-29 ENCOUNTER — NON-APPOINTMENT (OUTPATIENT)
Age: 71
End: 2021-01-29

## 2021-02-02 ENCOUNTER — NON-APPOINTMENT (OUTPATIENT)
Age: 71
End: 2021-02-02

## 2021-02-03 ENCOUNTER — APPOINTMENT (OUTPATIENT)
Dept: INTERNAL MEDICINE | Facility: CLINIC | Age: 71
End: 2021-02-03
Payer: COMMERCIAL

## 2021-02-03 VITALS
HEIGHT: 65 IN | OXYGEN SATURATION: 97 % | DIASTOLIC BLOOD PRESSURE: 60 MMHG | SYSTOLIC BLOOD PRESSURE: 100 MMHG | WEIGHT: 149 LBS | BODY MASS INDEX: 24.83 KG/M2 | HEART RATE: 64 BPM

## 2021-02-03 DIAGNOSIS — Z12.83 ENCOUNTER FOR SCREENING FOR MALIGNANT NEOPLASM OF SKIN: ICD-10-CM

## 2021-02-03 DIAGNOSIS — Z01.818 ENCOUNTER FOR OTHER PREPROCEDURAL EXAMINATION: ICD-10-CM

## 2021-02-03 DIAGNOSIS — Z85.46 PERSONAL HISTORY OF MALIGNANT NEOPLASM OF PROSTATE: ICD-10-CM

## 2021-02-03 DIAGNOSIS — Z11.59 ENCOUNTER FOR SCREENING FOR OTHER VIRAL DISEASES: ICD-10-CM

## 2021-02-03 LAB
25(OH)D3 SERPL-MCNC: 35.7 NG/ML
ALBUMIN SERPL ELPH-MCNC: 4.5 G/DL
ALP BLD-CCNC: 51 U/L
ALT SERPL-CCNC: 20 U/L
ANION GAP SERPL CALC-SCNC: 13 MMOL/L
APPEARANCE: CLEAR
AST SERPL-CCNC: 23 U/L
BASOPHILS # BLD AUTO: 0.03 K/UL
BASOPHILS NFR BLD AUTO: 0.5 %
BILIRUB SERPL-MCNC: 0.3 MG/DL
BILIRUBIN URINE: NEGATIVE
BLOOD URINE: NEGATIVE
BUN SERPL-MCNC: 18 MG/DL
CALCIUM SERPL-MCNC: 9.4 MG/DL
CHLORIDE SERPL-SCNC: 101 MMOL/L
CHOLEST SERPL-MCNC: 123 MG/DL
CO2 SERPL-SCNC: 26 MMOL/L
COLOR: COLORLESS
CREAT SERPL-MCNC: 1.21 MG/DL
EOSINOPHIL # BLD AUTO: 0.21 K/UL
EOSINOPHIL NFR BLD AUTO: 3.3 %
ESTIMATED AVERAGE GLUCOSE: 114 MG/DL
GLUCOSE QUALITATIVE U: NEGATIVE
GLUCOSE SERPL-MCNC: 127 MG/DL
HBA1C MFR BLD HPLC: 5.6 %
HCT VFR BLD CALC: 42.4 %
HDLC SERPL-MCNC: 47 MG/DL
HGB BLD-MCNC: 13.7 G/DL
IMM GRANULOCYTES NFR BLD AUTO: 0.3 %
KETONES URINE: NEGATIVE
LDLC SERPL CALC-MCNC: 65 MG/DL
LEUKOCYTE ESTERASE URINE: NEGATIVE
LYMPHOCYTES # BLD AUTO: 1.38 K/UL
LYMPHOCYTES NFR BLD AUTO: 21.7 %
MAN DIFF?: NORMAL
MCHC RBC-ENTMCNC: 31.3 PG
MCHC RBC-ENTMCNC: 32.3 GM/DL
MCV RBC AUTO: 96.8 FL
MONOCYTES # BLD AUTO: 0.62 K/UL
MONOCYTES NFR BLD AUTO: 9.7 %
NEUTROPHILS # BLD AUTO: 4.11 K/UL
NEUTROPHILS NFR BLD AUTO: 64.5 %
NITRITE URINE: NEGATIVE
NONHDLC SERPL-MCNC: 76 MG/DL
PH URINE: 8
PLATELET # BLD AUTO: 191 K/UL
POTASSIUM SERPL-SCNC: 4.4 MMOL/L
PROT SERPL-MCNC: 6.9 G/DL
PROTEIN URINE: NEGATIVE
RBC # BLD: 4.38 M/UL
RBC # FLD: 12.4 %
SODIUM SERPL-SCNC: 140 MMOL/L
SPECIFIC GRAVITY URINE: 1.01
TRIGL SERPL-MCNC: 58 MG/DL
UROBILINOGEN URINE: NORMAL
WBC # FLD AUTO: 6.37 K/UL

## 2021-02-03 PROCEDURE — 99397 PER PM REEVAL EST PAT 65+ YR: CPT

## 2021-02-03 PROCEDURE — 99072 ADDL SUPL MATRL&STAF TM PHE: CPT

## 2021-02-03 RX ORDER — SODIUM SULFATE, POTASSIUM SULFATE, MAGNESIUM SULFATE 17.5; 3.13; 1.6 G/ML; G/ML; G/ML
17.5-3.13-1.6 SOLUTION, CONCENTRATE ORAL
Qty: 1 | Refills: 0 | Status: DISCONTINUED | COMMUNITY
Start: 2020-08-31 | End: 2021-02-03

## 2021-02-03 RX ORDER — TRIAMCINOLONE ACETONIDE 1 MG/G
0.1 OINTMENT TOPICAL
Qty: 1 | Refills: 1 | Status: DISCONTINUED | COMMUNITY
Start: 2019-04-05 | End: 2021-02-03

## 2021-02-03 NOTE — HISTORY OF PRESENT ILLNESS
[FreeTextEntry1] : physical [de-identified] : 69 yo man with h/o as below here for CPE.\par Feeling well overall, no complaints.\par Still working, working from home.  Goes to see granddaughter in [place], otherwise doesn't do much. \par Got first Moderna shot, next one scheduled next Thursday.\par Had colonoscopy within past year.\par Saw urologist Dr. Lund this year.\par Will see optho, also sees derm.\par Will try to do exercise daily, elliptical 30 minutes/day few times/week, will also go to .  Tries to eat healthy.\par No other active issues.

## 2021-02-03 NOTE — REVIEW OF SYSTEMS
[Nocturia] : nocturia [Impotence] : impotence [Negative] : Musculoskeletal [Fever] : no fever [Chills] : no chills [Fatigue] : no fatigue [Recent Change In Weight] : ~T no recent weight change [Chest Pain] : no chest pain [Palpitations] : no palpitations [Shortness Of Breath] : no shortness of breath [Cough] : no cough [Dyspnea on Exertion] : not dyspnea on exertion [Abdominal Pain] : no abdominal pain [Nausea] : no nausea [Constipation] : no constipation [Diarrhea] : no diarrhea [Vomiting] : no vomiting [Heartburn] : no heartburn [Melena] : no melena [Dysuria] : no dysuria [Skin Rash] : no skin rash [Dizziness] : no dizziness [Fainting] : no fainting [Anxiety] : no anxiety [Depression] : no depression [Easy Bleeding] : no easy bleeding [Easy Bruising] : no easy bruising [Swollen Glands] : no swollen glands [FreeTextEntry6] : rare shortness of breath, for past 2 years at random [FreeTextEntry7] : occasional blood in stool (GI aware, did cautery) [FreeTextEntry8] : rare dysuria but doesn't last, nocturia 1-2 times/night

## 2021-02-03 NOTE — PHYSICAL EXAM
[No Acute Distress] : no acute distress [Well Nourished] : well nourished [Well Developed] : well developed [Well-Appearing] : well-appearing [PERRL] : pupils equal round and reactive to light [EOMI] : extraocular movements intact [Normal TMs] : both tympanic membranes were normal [No Lymphadenopathy] : no lymphadenopathy [Supple] : supple [Thyroid Normal, No Nodules] : the thyroid was normal and there were no nodules present [No Respiratory Distress] : no respiratory distress  [No Accessory Muscle Use] : no accessory muscle use [Clear to Auscultation] : lungs were clear to auscultation bilaterally [Normal Rate] : normal rate  [Regular Rhythm] : with a regular rhythm [Normal S1, S2] : normal S1 and S2 [No Murmur] : no murmur heard [No Carotid Bruits] : no carotid bruits [Pedal Pulses Present] : the pedal pulses are present [No Edema] : there was no peripheral edema [Normal Appearance] : normal in appearance [Soft] : abdomen soft [Non Tender] : non-tender [Non-distended] : non-distended [No Masses] : no abdominal mass palpated [No HSM] : no HSM [Normal Bowel Sounds] : normal bowel sounds [Normal Supraclavicular Nodes] : no supraclavicular lymphadenopathy [Normal Posterior Cervical Nodes] : no posterior cervical lymphadenopathy [Normal Anterior Cervical Nodes] : no anterior cervical lymphadenopathy [No Joint Swelling] : no joint swelling [No Rash] : no rash [Normal Gait] : normal gait [Normal Affect] : the affect was normal [de-identified] : done by NAILA

## 2021-02-03 NOTE — ASSESSMENT
[FreeTextEntry1] : 69 yo man with h/o as above including prostate cancer s/p prostatectomy complicated by ED and occasional leakage of urine, preDM, hyperlipidemia, here for CPE.\par 1.  CV - bp at goal, lipids at goal on statin\par 2.   - following with  for prostate cancer\par 3.  GI - colonoscopy utd\par 4.  Endo - preDM previously, recent A1c nl but slightly elevated glucose fasting on cmp, to c/w diet/exercise\par 5.  HCM - reviewed other recent labs with pt, nl cbc, nl cmp otherwise, nl a1c, nl lipids, nl vitamin d; check UA given fam hx bladder ca in first degree relative; vaccines utd\par 6.  RTO prn or 1 year

## 2021-02-04 ENCOUNTER — TRANSCRIPTION ENCOUNTER (OUTPATIENT)
Age: 71
End: 2021-02-04

## 2021-02-12 ENCOUNTER — TRANSCRIPTION ENCOUNTER (OUTPATIENT)
Age: 71
End: 2021-02-12

## 2021-02-26 ENCOUNTER — TRANSCRIPTION ENCOUNTER (OUTPATIENT)
Age: 71
End: 2021-02-26

## 2021-05-11 ENCOUNTER — RX RENEWAL (OUTPATIENT)
Age: 71
End: 2021-05-11

## 2021-08-02 ENCOUNTER — APPOINTMENT (OUTPATIENT)
Dept: GASTROENTEROLOGY | Facility: CLINIC | Age: 71
End: 2021-08-02

## 2021-08-05 ENCOUNTER — RX RENEWAL (OUTPATIENT)
Age: 71
End: 2021-08-05

## 2021-08-25 ENCOUNTER — NON-APPOINTMENT (OUTPATIENT)
Age: 71
End: 2021-08-25

## 2021-08-27 ENCOUNTER — NON-APPOINTMENT (OUTPATIENT)
Age: 71
End: 2021-08-27

## 2021-08-28 ENCOUNTER — APPOINTMENT (OUTPATIENT)
Dept: ORTHOPEDIC SURGERY | Facility: CLINIC | Age: 71
End: 2021-08-28
Payer: COMMERCIAL

## 2021-08-28 VITALS
DIASTOLIC BLOOD PRESSURE: 76 MMHG | BODY MASS INDEX: 24.16 KG/M2 | WEIGHT: 145 LBS | HEIGHT: 65 IN | SYSTOLIC BLOOD PRESSURE: 128 MMHG | HEART RATE: 86 BPM

## 2021-08-28 PROCEDURE — 73030 X-RAY EXAM OF SHOULDER: CPT | Mod: RT

## 2021-08-28 PROCEDURE — 99203 OFFICE O/P NEW LOW 30 MIN: CPT

## 2021-08-28 NOTE — DISCUSSION/SUMMARY
[de-identified] : Educated on rest, ice, and OTC NSAIDs PRN pain\par Follow up with clinic in 3-4 weeks if pain persists\par Consider PT at that time\par All options discussed with patient\par All questions by patient answered to their satisfaction\par Patient expresses full understanding and agreement with plan\par Patient is happy with the office visit\par

## 2021-08-28 NOTE — PHYSICAL EXAM
[de-identified] : Right Shoulder: No obvious deformities, atrophy, ecchymosis, or swelling/effusion. Negative tenderness to palpation of AC joint, clavicle, sternoclavicular joint, glenohumeral joint, rotator cuff, and proximal biceps/triceps. Normal active and passive range of motion, including flexion to 180 degrees, abduction to 180 degrees, and internal/external rotation to 75 degrees. Negative provocative testing, including Hoang, Neer's, Cross-Body Adduction, [place]'s, Speeds tests, Drop Arm, Empty Can, and Lift Off tests. Neurovascular status is intact.\par \par Right Calf: No obvious deformities, atrophy, ecchymosis, or swelling/effusion. Good calf strength. Normal knee motion.\par \par Otherwise, no apparent distress. Alert and oriented x 3. Normal mood and affect. No atrophy or swelling. 5 out of 5 motor strength. Sensation is intact and symmetrical. Normal deep tendon reflexes. Full range of motion of shoulder, elbows, hips, and knees (flexion, extension, and rotation). No palpatory tenderness or palpable lymph nodes. Negative straight leg raise. Normal finger-to-nose test. No pathological reflexes. Normal ambulation. No upper or lower extremity instability. [de-identified] : 3 views of right shoulder are negative for any obvious fractures or dislocations. Mild glenohumeral joint osteoarthritis is noted. The patient understands that this is a wet read and I am not a radiologist. If the final read reveals something different than discussed in the office, then the patient will get a call back in the next 24 - 48 hours to discuss.\par

## 2021-08-28 NOTE — HISTORY OF PRESENT ILLNESS
[de-identified] : Mr. BRENNEN CORDERO is a 71 year male who presents to office complaining of right calf pain and right shoulder pain.\par Right calf pain initially occurred 3 weeks ago when he got angry and "twisted his leg" hurting his proximal calf in the process.\par Since then, he notes discomfort in his calf with increased physical activity and also intermittent calf swelling.\par However, this has improved well over the last few weeks with rest.\par Right shoulder pain is a more recent presentation, brought on by "sleeping on his shoulder" with his shoulder abducted, over the last few days.\par Denies any previous injury, trauma, or fall to his shoulder.\par Shoulder pain is in the joint and also with certain overhead movements such as shoulder abduction and internal rotation.\par All review of systems, family history, social history, surgical history, past medical history, medications, and allergies not previously stated as positive are negative. They were reviewed by me today with the patient and documented accordingly.

## 2021-09-15 ENCOUNTER — APPOINTMENT (OUTPATIENT)
Dept: ORTHOPEDIC SURGERY | Facility: CLINIC | Age: 71
End: 2021-09-15

## 2021-09-29 ENCOUNTER — APPOINTMENT (OUTPATIENT)
Dept: UROLOGY | Facility: CLINIC | Age: 71
End: 2021-09-29

## 2021-09-30 ENCOUNTER — APPOINTMENT (OUTPATIENT)
Dept: UROLOGY | Facility: CLINIC | Age: 71
End: 2021-09-30
Payer: COMMERCIAL

## 2021-09-30 VITALS
DIASTOLIC BLOOD PRESSURE: 67 MMHG | TEMPERATURE: 97.2 F | SYSTOLIC BLOOD PRESSURE: 112 MMHG | OXYGEN SATURATION: 98 % | HEART RATE: 78 BPM

## 2021-09-30 PROCEDURE — 99213 OFFICE O/P EST LOW 20 MIN: CPT

## 2021-09-30 NOTE — HISTORY OF PRESENT ILLNESS
[FreeTextEntry1] : CC: personal history of CaP, LUTS, urethral stricture, ED\par \par History of RRP in 2003 in [place] with Dr. Hutson, then BCR and XRT in 2008. He also had a history of bulbar stricture requiring dilation in the past, and radiation cystitis.\par \par PSA 9/2020: <0.01\par \par Urinary control: Currently doing well on solifenacin. Nocturia. Has tea before bed. Not bothersome. Occasional leakage, wears washable underwear with built in pad - 1 per day. Overall happy. Feels emptying bladder completely.\par \par Erectile function: 85% of the time effective erections with 5 mg tadalafil daily \par \par No other issues. No hematuria. \par \par SURG: RRP \par FAMHX: brother recently diagnosed with bladder cancer\par SOCIAL nonsmoker\par ROS: negative CV,RESP,GI

## 2021-09-30 NOTE — ASSESSMENT
[FreeTextEntry1] : 71 year old s/p RRP 2003 and radiation 2008. PSA undetectable one year ago.\par \par - PSA today\par - tadalafil refill\par - RTC in one year\par

## 2021-10-04 ENCOUNTER — TRANSCRIPTION ENCOUNTER (OUTPATIENT)
Age: 71
End: 2021-10-04

## 2021-10-04 LAB — PSA, POST - PROSTATECTOMY: <0.01 NG/ML

## 2021-10-28 ENCOUNTER — NON-APPOINTMENT (OUTPATIENT)
Age: 71
End: 2021-10-28

## 2021-10-28 ENCOUNTER — APPOINTMENT (OUTPATIENT)
Dept: OPHTHALMOLOGY | Facility: CLINIC | Age: 71
End: 2021-10-28
Payer: COMMERCIAL

## 2021-10-28 PROCEDURE — 92020 GONIOSCOPY: CPT

## 2021-10-28 PROCEDURE — 92014 COMPRE OPH EXAM EST PT 1/>: CPT

## 2021-11-09 ENCOUNTER — APPOINTMENT (OUTPATIENT)
Dept: DERMATOLOGY | Facility: CLINIC | Age: 71
End: 2021-11-09
Payer: COMMERCIAL

## 2021-11-09 DIAGNOSIS — L30.9 DERMATITIS, UNSPECIFIED: ICD-10-CM

## 2021-11-09 PROCEDURE — 99213 OFFICE O/P EST LOW 20 MIN: CPT

## 2021-11-22 ENCOUNTER — APPOINTMENT (OUTPATIENT)
Dept: GASTROENTEROLOGY | Facility: CLINIC | Age: 71
End: 2021-11-22
Payer: COMMERCIAL

## 2021-11-22 VITALS
DIASTOLIC BLOOD PRESSURE: 75 MMHG | SYSTOLIC BLOOD PRESSURE: 110 MMHG | WEIGHT: 147 LBS | TEMPERATURE: 98 F | HEART RATE: 62 BPM | RESPIRATION RATE: 14 BRPM | OXYGEN SATURATION: 97 % | BODY MASS INDEX: 24.49 KG/M2 | HEIGHT: 65 IN

## 2021-11-22 DIAGNOSIS — K62.7 RADIATION PROCTITIS: ICD-10-CM

## 2021-11-22 PROCEDURE — 99213 OFFICE O/P EST LOW 20 MIN: CPT

## 2021-11-22 NOTE — REASON FOR VISIT
[Follow-Up: _____] : a [unfilled] follow-up visit [FreeTextEntry1] : radiation proctitis, hx of adenomatous colon polyp

## 2021-11-22 NOTE — HISTORY OF PRESENT ILLNESS
[FreeTextEntry1] : Mr Romo presents for follow-up visit.  He relates that he has occasional, approximately once a week, bright red blood seen on the toilet paper upon wiping.  Couple weeks ago for the first time in years he noticed blood dripping from his rectum.  He denies constipation or diarrhea.  He reports good bowel movements daily without effort.  No abdominal pain, nausea or vomiting.  No weight loss.

## 2021-11-22 NOTE — ASSESSMENT
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  Status post APC.  He also had several adenomatous colon polyps.  He has occasional rectal bleeding, on average once a week, from radiation proctitis.  Less likely hemorrhoidal. Review of recent blood work showing no evidence of anemia.  I recommend flexible sigmoidoscopy with APC if he has worsening rectal bleeding.  He will monitor closely and reach out to me if this does happen.  Otherwise he needs a surveillance colonoscopy year [id number], 3 to 5 years after the colonoscopy in 2020.  Questions were answered.  He stated understanding.

## 2022-02-04 ENCOUNTER — NON-APPOINTMENT (OUTPATIENT)
Age: 72
End: 2022-02-04

## 2022-02-07 ENCOUNTER — APPOINTMENT (OUTPATIENT)
Dept: INTERNAL MEDICINE | Facility: CLINIC | Age: 72
End: 2022-02-07
Payer: COMMERCIAL

## 2022-02-07 VITALS
WEIGHT: 150 LBS | DIASTOLIC BLOOD PRESSURE: 70 MMHG | HEART RATE: 74 BPM | OXYGEN SATURATION: 98 % | BODY MASS INDEX: 24.99 KG/M2 | HEIGHT: 65 IN | SYSTOLIC BLOOD PRESSURE: 120 MMHG

## 2022-02-07 DIAGNOSIS — S86.811A STRAIN OF OTHER MUSCLE(S) AND TENDON(S) AT LOWER LEG LEVEL, RIGHT LEG, INITIAL ENCOUNTER: ICD-10-CM

## 2022-02-07 DIAGNOSIS — Z86.03 PERSONAL HISTORY OF NEOPLASM OF UNCERTAIN BEHAVIOR: ICD-10-CM

## 2022-02-07 DIAGNOSIS — H43.22 CRYSTALLINE DEPOSITS IN VITREOUS BODY, LEFT EYE: ICD-10-CM

## 2022-02-07 DIAGNOSIS — L81.8 OTHER SPECIFIED DISORDERS OF PIGMENTATION: ICD-10-CM

## 2022-02-07 DIAGNOSIS — H01.02A SQUAMOUS BLEPHARITIS RIGHT EYE, UPPER AND LOWER EYELIDS: ICD-10-CM

## 2022-02-07 DIAGNOSIS — Z12.83 ENCOUNTER FOR SCREENING FOR MALIGNANT NEOPLASM OF SKIN: ICD-10-CM

## 2022-02-07 DIAGNOSIS — H01.02B SQUAMOUS BLEPHARITIS RIGHT EYE, UPPER AND LOWER EYELIDS: ICD-10-CM

## 2022-02-07 DIAGNOSIS — M25.511 PAIN IN RIGHT SHOULDER: ICD-10-CM

## 2022-02-07 PROCEDURE — 99397 PER PM REEVAL EST PAT 65+ YR: CPT

## 2022-02-14 ENCOUNTER — TRANSCRIPTION ENCOUNTER (OUTPATIENT)
Age: 72
End: 2022-02-14

## 2022-02-14 LAB
ALBUMIN SERPL ELPH-MCNC: 4.5 G/DL
ALP BLD-CCNC: 53 U/L
ALT SERPL-CCNC: 19 U/L
ANION GAP SERPL CALC-SCNC: 11 MMOL/L
APPEARANCE: CLEAR
AST SERPL-CCNC: 24 U/L
BACTERIA: NEGATIVE
BASOPHILS # BLD AUTO: 0.04 K/UL
BASOPHILS NFR BLD AUTO: 0.6 %
BILIRUB SERPL-MCNC: 0.3 MG/DL
BILIRUBIN URINE: NEGATIVE
BLOOD URINE: NORMAL
BUN SERPL-MCNC: 16 MG/DL
CALCIUM SERPL-MCNC: 9.4 MG/DL
CHLORIDE SERPL-SCNC: 101 MMOL/L
CHOLEST SERPL-MCNC: 142 MG/DL
CO2 SERPL-SCNC: 27 MMOL/L
COLOR: NORMAL
CREAT SERPL-MCNC: 1.08 MG/DL
EOSINOPHIL # BLD AUTO: 0.25 K/UL
EOSINOPHIL NFR BLD AUTO: 3.7 %
ESTIMATED AVERAGE GLUCOSE: 117 MG/DL
GLUCOSE QUALITATIVE U: NEGATIVE
GLUCOSE SERPL-MCNC: 103 MG/DL
HBA1C MFR BLD HPLC: 5.7 %
HCT VFR BLD CALC: 42.1 %
HDLC SERPL-MCNC: 43 MG/DL
HGB BLD-MCNC: 13.8 G/DL
HYALINE CASTS: 0 /LPF
IMM GRANULOCYTES NFR BLD AUTO: 0.3 %
KETONES URINE: NEGATIVE
LDLC SERPL CALC-MCNC: 82 MG/DL
LEUKOCYTE ESTERASE URINE: NEGATIVE
LYMPHOCYTES # BLD AUTO: 1.57 K/UL
LYMPHOCYTES NFR BLD AUTO: 23 %
MAN DIFF?: NORMAL
MCHC RBC-ENTMCNC: 31.9 PG
MCHC RBC-ENTMCNC: 32.8 GM/DL
MCV RBC AUTO: 97.2 FL
MICROSCOPIC-UA: NORMAL
MONOCYTES # BLD AUTO: 0.67 K/UL
MONOCYTES NFR BLD AUTO: 9.8 %
NEUTROPHILS # BLD AUTO: 4.27 K/UL
NEUTROPHILS NFR BLD AUTO: 62.6 %
NITRITE URINE: NEGATIVE
NONHDLC SERPL-MCNC: 98 MG/DL
PH URINE: 6.5
PLATELET # BLD AUTO: 204 K/UL
POTASSIUM SERPL-SCNC: 4.6 MMOL/L
PROT SERPL-MCNC: 6.8 G/DL
PROTEIN URINE: NEGATIVE
PSA SERPL-MCNC: <0.01 NG/ML
RBC # BLD: 4.33 M/UL
RBC # FLD: 12.9 %
RED BLOOD CELLS URINE: 3 /HPF
SODIUM SERPL-SCNC: 139 MMOL/L
SPECIFIC GRAVITY URINE: 1.02
SQUAMOUS EPITHELIAL CELLS: 0 /HPF
TRIGL SERPL-MCNC: 83 MG/DL
TSH SERPL-ACNC: 4.15 UIU/ML
UROBILINOGEN URINE: NORMAL
WBC # FLD AUTO: 6.82 K/UL
WHITE BLOOD CELLS URINE: 0 /HPF

## 2022-02-14 NOTE — HISTORY OF PRESENT ILLNESS
[FreeTextEntry1] : physical [de-identified] : 70 yo man with h/o as below here for CPE.\par Works from home, doing well, going to continue working from home as doesn't have to commute.  Plans to continue working.  \par Wife will retire but stays active.  \par Does exercise, has  coming to the house twice/week.  Eating well.  Hard to lose weight. \par Feeling well overall.\par Still has some urologic issues, some urinary urgency, some erectile dysfunction, follows with .\par Has occasional rectal bleeding, saw GI and said if bleeding worsening, would f/up for flex sig, but not very \par significant.  \par No other active issues.

## 2022-02-14 NOTE — ADDENDUM
[FreeTextEntry1] : 2/14/22:  Labs nl except A1c 5.7 c/w slight preDM, to work on diet/exercise - messaged to pt.

## 2022-02-14 NOTE — ASSESSMENT
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  CV - bp at goal, check lipids\par 2.  Endo - check A1c for preDM, TSH for slight weight gain\par 3.  GI - colonoscopy utd, if rectal bleeding worsens will f/up with GI\par 4.   - check PSA to f/up given hx prostate cancer; UA given brother with bladder cancer\par 5.  HCM - check below labs; vaccines utd\par 6.  RTO prn or 1 year

## 2022-02-14 NOTE — REVIEW OF SYSTEMS
[Recent Change In Weight] : ~T recent weight change [Impotence] : impotence [Negative] : Musculoskeletal [Fever] : no fever [Chills] : no chills [Fatigue] : no fatigue [Chest Pain] : no chest pain [Palpitations] : no palpitations [Shortness Of Breath] : no shortness of breath [Cough] : no cough [Dyspnea on Exertion] : not dyspnea on exertion [Abdominal Pain] : no abdominal pain [Nausea] : no nausea [Constipation] : no constipation [Diarrhea] : no diarrhea [Vomiting] : no vomiting [Heartburn] : no heartburn [Melena] : no melena [Dysuria] : no dysuria [Skin Rash] : no skin rash [Headache] : no headache [Dizziness] : no dizziness [Fainting] : no fainting [Anxiety] : no anxiety [Depression] : no depression [Easy Bleeding] : no easy bleeding [Easy Bruising] : no easy bruising [FreeTextEntry7] : some rectal bleeding [FreeTextEntry8] : see hpi [de-identified] : sees derm, has eczema

## 2022-02-14 NOTE — PHYSICAL EXAM
[No Acute Distress] : no acute distress [Well Nourished] : well nourished [Well Developed] : well developed [Well-Appearing] : well-appearing [PERRL] : pupils equal round and reactive to light [EOMI] : extraocular movements intact [Normal Oropharynx] : the oropharynx was normal [Normal TMs] : both tympanic membranes were normal [No Lymphadenopathy] : no lymphadenopathy [Supple] : supple [Thyroid Normal, No Nodules] : the thyroid was normal and there were no nodules present [No Respiratory Distress] : no respiratory distress  [No Accessory Muscle Use] : no accessory muscle use [Clear to Auscultation] : lungs were clear to auscultation bilaterally [Normal Rate] : normal rate  [Regular Rhythm] : with a regular rhythm [Normal S1, S2] : normal S1 and S2 [No Murmur] : no murmur heard [No Carotid Bruits] : no carotid bruits [Pedal Pulses Present] : the pedal pulses are present [Normal Appearance] : normal in appearance [Soft] : abdomen soft [Non Tender] : non-tender [Non-distended] : non-distended [No Masses] : no abdominal mass palpated [No HSM] : no HSM [Normal Bowel Sounds] : normal bowel sounds [Normal Supraclavicular Nodes] : no supraclavicular lymphadenopathy [Normal Posterior Cervical Nodes] : no posterior cervical lymphadenopathy [Normal Anterior Cervical Nodes] : no anterior cervical lymphadenopathy [No Joint Swelling] : no joint swelling [No Rash] : no rash [Normal Gait] : normal gait [Normal Affect] : the affect was normal [de-identified] : trace pitting edema LE b/l [de-identified] : done by [facility]

## 2022-02-14 NOTE — HEALTH RISK ASSESSMENT
[No falls in past year] : Patient reported no falls in the past year [Fully functional (bathing, dressing, toileting, transferring, walking, feeding)] : Fully functional (bathing, dressing, toileting, transferring, walking, feeding) [Fully functional (using the telephone, shopping, preparing meals, housekeeping, doing laundry, using] : Fully functional and needs no help or supervision to perform IADLs (using the telephone, shopping, preparing meals, housekeeping, doing laundry, using transportation, managing medications and managing finances) [Designated Healthcare Proxy] : Designated healthcare proxy [0] : 2) Feeling down, depressed, or hopeless: Not at all (0) [ColonoscopyComments] : utd [ColonoscopyDate] : 10/20 [AdvancecareDate] : 02/22

## 2022-04-11 PROBLEM — Z11.59 SCREENING FOR VIRAL DISEASE: Status: RESOLVED | Noted: 2020-12-29 | Resolved: 2021-02-03

## 2022-04-26 ENCOUNTER — RX RENEWAL (OUTPATIENT)
Age: 72
End: 2022-04-26

## 2022-08-31 NOTE — ASU DISCHARGE PLAN (ADULT/PEDIATRIC) - CLICK TO LAUNCH ORM
. Patient will be discharged home today  This writer scheduled patient's appointment with [facility] for medication management  This writer discussed therapy appointments with [facility], however they have an extensive wait list and patient will not have therapy at [address] [place] [address]  This writer discussed with patient and she agreed to be referred to another agency for OP therapy  This writer referred patient to [facility]  Patient will have her first appt on Friday  Patient's daughter will transport her home today   Patient signed JASMIN for [address]

## 2022-09-21 PROBLEM — Z85.46 HISTORY OF PROSTATE CANCER: Status: RESOLVED | Noted: 2018-08-28 | Resolved: 2022-09-21

## 2022-09-22 ENCOUNTER — APPOINTMENT (OUTPATIENT)
Dept: UROLOGY | Facility: CLINIC | Age: 72
End: 2022-09-22

## 2022-09-22 VITALS
HEIGHT: 65 IN | WEIGHT: 148 LBS | TEMPERATURE: 98.2 F | RESPIRATION RATE: 14 BRPM | DIASTOLIC BLOOD PRESSURE: 73 MMHG | HEART RATE: 94 BPM | BODY MASS INDEX: 24.66 KG/M2 | SYSTOLIC BLOOD PRESSURE: 120 MMHG

## 2022-09-22 DIAGNOSIS — N52.9 MALE ERECTILE DYSFUNCTION, UNSPECIFIED: ICD-10-CM

## 2022-09-22 DIAGNOSIS — Z85.46 PERSONAL HISTORY OF MALIGNANT NEOPLASM OF PROSTATE: ICD-10-CM

## 2022-09-22 PROCEDURE — 99214 OFFICE O/P EST MOD 30 MIN: CPT

## 2022-09-24 LAB — PSA SERPL-MCNC: <0.01 NG/ML

## 2022-09-29 ENCOUNTER — APPOINTMENT (OUTPATIENT)
Dept: UROLOGY | Facility: CLINIC | Age: 72
End: 2022-09-29

## 2023-01-04 ENCOUNTER — APPOINTMENT (OUTPATIENT)
Dept: OPHTHALMOLOGY | Facility: CLINIC | Age: 73
End: 2023-01-04
Payer: MEDICARE

## 2023-01-04 ENCOUNTER — NON-APPOINTMENT (OUTPATIENT)
Age: 73
End: 2023-01-04

## 2023-01-04 PROCEDURE — 92020 GONIOSCOPY: CPT

## 2023-01-04 PROCEDURE — 92014 COMPRE OPH EXAM EST PT 1/>: CPT

## 2023-02-13 ENCOUNTER — APPOINTMENT (OUTPATIENT)
Dept: INTERNAL MEDICINE | Facility: CLINIC | Age: 73
End: 2023-02-13
Payer: COMMERCIAL

## 2023-02-13 VITALS
HEART RATE: 83 BPM | OXYGEN SATURATION: 97 % | HEIGHT: 65 IN | WEIGHT: 151 LBS | SYSTOLIC BLOOD PRESSURE: 120 MMHG | DIASTOLIC BLOOD PRESSURE: 60 MMHG | BODY MASS INDEX: 25.16 KG/M2

## 2023-02-13 PROCEDURE — 99397 PER PM REEVAL EST PAT 65+ YR: CPT

## 2023-02-14 ENCOUNTER — APPOINTMENT (OUTPATIENT)
Dept: DERMATOLOGY | Facility: CLINIC | Age: 73
End: 2023-02-14
Payer: MEDICARE

## 2023-02-14 PROCEDURE — 17000 DESTRUCT PREMALG LESION: CPT | Mod: 59

## 2023-02-14 PROCEDURE — 17110 DESTRUCTION B9 LES UP TO 14: CPT

## 2023-02-14 PROCEDURE — 99213 OFFICE O/P EST LOW 20 MIN: CPT | Mod: 25

## 2023-02-23 ENCOUNTER — TRANSCRIPTION ENCOUNTER (OUTPATIENT)
Age: 73
End: 2023-02-23

## 2023-02-23 LAB
ALBUMIN SERPL ELPH-MCNC: 4.5 G/DL
ALP BLD-CCNC: 55 U/L
ALT SERPL-CCNC: 22 U/L
ANION GAP SERPL CALC-SCNC: 13 MMOL/L
APPEARANCE: CLEAR
AST SERPL-CCNC: 28 U/L
BACTERIA: NEGATIVE
BASOPHILS # BLD AUTO: 0.03 K/UL
BASOPHILS NFR BLD AUTO: 0.4 %
BILIRUB SERPL-MCNC: 0.5 MG/DL
BILIRUBIN URINE: NEGATIVE
BLOOD URINE: NEGATIVE
BUN SERPL-MCNC: 17 MG/DL
CALCIUM SERPL-MCNC: 9.5 MG/DL
CHLORIDE SERPL-SCNC: 101 MMOL/L
CHOLEST SERPL-MCNC: 131 MG/DL
CO2 SERPL-SCNC: 25 MMOL/L
COLOR: NORMAL
CREAT SERPL-MCNC: 1.17 MG/DL
EGFR: 66 ML/MIN/1.73M2
EOSINOPHIL # BLD AUTO: 0.24 K/UL
EOSINOPHIL NFR BLD AUTO: 3.5 %
ESTIMATED AVERAGE GLUCOSE: 114 MG/DL
GLUCOSE QUALITATIVE U: NEGATIVE
GLUCOSE SERPL-MCNC: 100 MG/DL
HBA1C MFR BLD HPLC: 5.6 %
HCT VFR BLD CALC: 40.1 %
HDLC SERPL-MCNC: 45 MG/DL
HGB BLD-MCNC: 13.6 G/DL
HYALINE CASTS: 0 /LPF
IMM GRANULOCYTES NFR BLD AUTO: 0.4 %
KETONES URINE: NEGATIVE
LDLC SERPL CALC-MCNC: 70 MG/DL
LEUKOCYTE ESTERASE URINE: NEGATIVE
LYMPHOCYTES # BLD AUTO: 1.44 K/UL
LYMPHOCYTES NFR BLD AUTO: 21.2 %
MAN DIFF?: NORMAL
MCHC RBC-ENTMCNC: 32.6 PG
MCHC RBC-ENTMCNC: 33.9 GM/DL
MCV RBC AUTO: 96.2 FL
MICROSCOPIC-UA: NORMAL
MONOCYTES # BLD AUTO: 0.62 K/UL
MONOCYTES NFR BLD AUTO: 9.1 %
NEUTROPHILS # BLD AUTO: 4.42 K/UL
NEUTROPHILS NFR BLD AUTO: 65.4 %
NITRITE URINE: NEGATIVE
NONHDLC SERPL-MCNC: 86 MG/DL
PH URINE: 7
PLATELET # BLD AUTO: 188 K/UL
POTASSIUM SERPL-SCNC: 4.6 MMOL/L
PROT SERPL-MCNC: 6.6 G/DL
PROTEIN URINE: NORMAL
PSA SERPL-MCNC: <0.01 NG/ML
RBC # BLD: 4.17 M/UL
RBC # FLD: 12.8 %
RED BLOOD CELLS URINE: 3 /HPF
SODIUM SERPL-SCNC: 139 MMOL/L
SPECIFIC GRAVITY URINE: 1.01
SQUAMOUS EPITHELIAL CELLS: 0 /HPF
TRIGL SERPL-MCNC: 78 MG/DL
UROBILINOGEN URINE: NORMAL
WBC # FLD AUTO: 6.78 K/UL
WHITE BLOOD CELLS URINE: 0 /HPF

## 2023-02-23 NOTE — HISTORY OF PRESENT ILLNESS
[FreeTextEntry1] : physical [de-identified] : 71 yo man with h/o as below here for CPE.\par Wife complaints that he snores if he is on his back.  Asking about nasal rinse.  Sleeps well, wakes up refreshed.  \par Saw optho Dr. Marycruz Chu, told has narrow angles, suggested laser iridotomy but pt will get second optho opinion.\par Still working but working remotely.\par Will see urologist next month.  Still having rectal bleeding, urinary leakage from radiation treatments and on solifenacin, sometimes a little bit of rectal leakage as well, wears pads.  May do colonoscopy soon (in 3 years from last one) due to some rectal bleeding that has been chronic.  \par In the last 3 months even more difficult to get an erection even with Cialis, will take 20 mg at a time, previously would work but now not really working, but at times can get an erection even without medication.\par Will see derm tomorrow.\par Works with  twice/week.\par In the last 2 months, will have a little bit of pain deep inside right lower quadrant/right inguinal region at times briefly, not sure if muscular as working with a , but felt similar pain when was first diagnosed with prostate cancer.\par No other active issues.

## 2023-02-23 NOTE — HEALTH RISK ASSESSMENT
[Fully functional (bathing, dressing, toileting, transferring, walking, feeding)] : Fully functional (bathing, dressing, toileting, transferring, walking, feeding) [Fully functional (using the telephone, shopping, preparing meals, housekeeping, doing laundry, using] : Fully functional and needs no help or supervision to perform IADLs (using the telephone, shopping, preparing meals, housekeeping, doing laundry, using transportation, managing medications and managing finances) [Designated Healthcare Proxy] : Designated healthcare proxy [No falls in past year] : Patient reported no falls in the past year [0] : 2) Feeling down, depressed, or hopeless: Not at all (0) [ColonoscopyDate] : 10/20 [ColonoscopyComments] : utd [AdvancecareDate] : 02/23

## 2023-02-23 NOTE — PHYSICAL EXAM
[No Acute Distress] : no acute distress [Well Nourished] : well nourished [Well Developed] : well developed [Well-Appearing] : well-appearing [PERRL] : pupils equal round and reactive to light [EOMI] : extraocular movements intact [Normal Oropharynx] : the oropharynx was normal [Normal TMs] : both tympanic membranes were normal [No Lymphadenopathy] : no lymphadenopathy [Supple] : supple [Thyroid Normal, No Nodules] : the thyroid was normal and there were no nodules present [No Respiratory Distress] : no respiratory distress  [No Accessory Muscle Use] : no accessory muscle use [Clear to Auscultation] : lungs were clear to auscultation bilaterally [Normal Rate] : normal rate  [Regular Rhythm] : with a regular rhythm [Normal S1, S2] : normal S1 and S2 [No Murmur] : no murmur heard [No Carotid Bruits] : no carotid bruits [Pedal Pulses Present] : the pedal pulses are present [No Edema] : there was no peripheral edema [Normal Appearance] : normal in appearance [Soft] : abdomen soft [Non Tender] : non-tender [Non-distended] : non-distended [No Masses] : no abdominal mass palpated [No HSM] : no HSM [Normal Bowel Sounds] : normal bowel sounds [Normal Supraclavicular Nodes] : no supraclavicular lymphadenopathy [Normal Posterior Cervical Nodes] : no posterior cervical lymphadenopathy [Normal Anterior Cervical Nodes] : no anterior cervical lymphadenopathy [No Joint Swelling] : no joint swelling [No Rash] : no rash [Normal Gait] : normal gait [Normal Affect] : the affect was normal [de-identified] : crowded posterior oropharynx; cerumen in auditory canals b/l

## 2023-02-23 NOTE — ASSESSMENT
[FreeTextEntry1] : 71 yo man with h/o as above including prostate cancer s/p prostatectomy and radiation treatments complicated by radiation proctitis/rectal bleeding and leakage, urinary leakage, ED, preDM, hyperlipidemia, here for CPE.\par 1.   - follows with urology, will recheck psa for recurrent pain, check UA given fam hx bladder cancer; referred to Dr. Kirby to discuss other treatment options for ED\par 2.  GI - will f/up with GI next year for colonoscopy when due\par 3.  Endo - check A1c and lipids\par 4.  ENT - referred to sleep medicine for sleep study given snoring and crowded oropharynx\par 5.  HCM - check below labs; vaccines utd\par 6.  RTO prn or 1 year\par \par \par

## 2023-02-23 NOTE — REVIEW OF SYSTEMS
[Nocturia] : nocturia [Impotence] : impotence [Anxiety] : anxiety [Negative] : Musculoskeletal [Fever] : no fever [Chills] : no chills [Fatigue] : no fatigue [Recent Change In Weight] : ~T no recent weight change [Chest Pain] : no chest pain [Palpitations] : no palpitations [Shortness Of Breath] : no shortness of breath [Cough] : no cough [Dyspnea on Exertion] : not dyspnea on exertion [Abdominal Pain] : no abdominal pain [Nausea] : no nausea [Constipation] : no constipation [Diarrhea] : no diarrhea [Vomiting] : no vomiting [Heartburn] : no heartburn [Melena] : no melena [Dysuria] : no dysuria [Skin Rash] : no skin rash [Headache] : no headache [Dizziness] : no dizziness [Fainting] : no fainting [Depression] : no depression [Easy Bleeding] : no easy bleeding [Easy Bruising] : no easy bruising [FreeTextEntry3] : see hpi [FreeTextEntry4] : see hpi [FreeTextEntry7] : see hpi [FreeTextEntry8] : see hpi [FreeTextEntry9] : see hpi

## 2023-03-14 ENCOUNTER — TRANSCRIPTION ENCOUNTER (OUTPATIENT)
Age: 73
End: 2023-03-14

## 2023-03-15 ENCOUNTER — TRANSCRIPTION ENCOUNTER (OUTPATIENT)
Age: 73
End: 2023-03-15

## 2023-03-15 ENCOUNTER — APPOINTMENT (OUTPATIENT)
Dept: OPHTHALMOLOGY | Facility: CLINIC | Age: 73
End: 2023-03-15

## 2023-03-22 ENCOUNTER — APPOINTMENT (OUTPATIENT)
Dept: OPHTHALMOLOGY | Facility: CLINIC | Age: 73
End: 2023-03-22

## 2023-03-28 ENCOUNTER — OFFICE (OUTPATIENT)
Dept: URBAN - METROPOLITAN AREA CLINIC 29 | Facility: CLINIC | Age: 73
Setting detail: OPHTHALMOLOGY
End: 2023-03-28
Payer: MEDICARE

## 2023-03-28 DIAGNOSIS — H40.033: ICD-10-CM

## 2023-03-28 DIAGNOSIS — H43.22: ICD-10-CM

## 2023-03-28 DIAGNOSIS — H01.001: ICD-10-CM

## 2023-03-28 DIAGNOSIS — H01.004: ICD-10-CM

## 2023-03-28 PROCEDURE — 92004 COMPRE OPH EXAM NEW PT 1/>: CPT | Performed by: OPHTHALMOLOGY

## 2023-03-28 PROCEDURE — 92020 GONIOSCOPY: CPT | Performed by: OPHTHALMOLOGY

## 2023-03-28 ASSESSMENT — LID EXAM ASSESSMENTS
OD_BLEPHARITIS: 2+
OS_BLEPHARITIS: 2+

## 2023-03-28 ASSESSMENT — REFRACTION_CURRENTRX
OS_CYLINDER: +1.75
OD_ADD: +2.50
OD_CYLINDER: +1.25
OD_SPHERE: +3.75
OS_SPHERE: +3.75
OD_OVR_VA: 20/
OS_ADD: +2.50
OS_OVR_VA: 20/
OS_AXIS: 030
OD_AXIS: 160

## 2023-03-28 ASSESSMENT — REFRACTION_AUTOREFRACTION
OD_AXIS: 160
OS_CYLINDER: +2.00
OD_CYLINDER: +1.50
OS_AXIS: 030
OS_SPHERE: +3.50
OD_SPHERE: +3.75

## 2023-03-28 ASSESSMENT — CONFRONTATIONAL VISUAL FIELD TEST (CVF)
OS_FINDINGS: FULL
OD_FINDINGS: FULL

## 2023-03-28 ASSESSMENT — SPHEQUIV_DERIVED
OD_SPHEQUIV: 4.5
OS_SPHEQUIV: 4.5

## 2023-03-28 ASSESSMENT — VISUAL ACUITY
OD_BCVA: 20/25
OS_BCVA: 20/25

## 2023-03-28 ASSESSMENT — TONOMETRY
OD_IOP_MMHG: 14
OS_IOP_MMHG: 16

## 2023-03-30 ENCOUNTER — APPOINTMENT (OUTPATIENT)
Dept: UROLOGY | Facility: CLINIC | Age: 73
End: 2023-03-30

## 2023-04-30 ENCOUNTER — RX RENEWAL (OUTPATIENT)
Age: 73
End: 2023-04-30

## 2023-05-18 ENCOUNTER — LABORATORY RESULT (OUTPATIENT)
Age: 73
End: 2023-05-18

## 2023-05-18 ENCOUNTER — APPOINTMENT (OUTPATIENT)
Dept: UROLOGY | Facility: CLINIC | Age: 73
End: 2023-05-18
Payer: MEDICARE

## 2023-05-18 VITALS
DIASTOLIC BLOOD PRESSURE: 68 MMHG | HEIGHT: 65 IN | SYSTOLIC BLOOD PRESSURE: 118 MMHG | HEART RATE: 76 BPM | WEIGHT: 150 LBS | BODY MASS INDEX: 24.99 KG/M2

## 2023-05-18 PROCEDURE — 99215 OFFICE O/P EST HI 40 MIN: CPT

## 2023-05-18 NOTE — HISTORY OF PRESENT ILLNESS
[FreeTextEntry1] : Patient is a 72-year-old who presents with a chief complaint of erectile dysfunction.  He has a history of RRP in 2003 in [place] with Dr. Hutson, then BCR and XRT in 2008. He also had a history of bulbar stricture requiring dilation in the past, and radiation cystitis. Took patient 1 year to have return of his erections post surgery.  He states that this has been present for the past  years. Pt has been taking tadalafil, however in the past 6 months his erectile function has gotten worse which he rates as 1 out of 10 in quality. It causes both he and his partner great stress.  I reviewed the questionnaire he completed in detail. His erections are not modified with the degree of sexual stimulation. He has difficulty maintaining an erection. He describes a normal libido.  His sexual dysfunction occurs with both sexual relations and masturbation.  His erections are somewhat improved with PDE5 inhibitors.  His partner is understanding and was unable to be with him at the visit today. He is .   \par \par The patient denies fevers, chills, nausea and/or vomiting and no unexplained weight loss.  His past medical history is non-contributory.  In his present occupation he has no known toxin exposure. He does not smoke and drinks socially.  He has no known drug allergies. His review of systems and past medical history demonstrates no significant urologic issues (see patient completed questionnaire). His family history demonstrates no significant urologic issues.

## 2023-05-23 LAB
25(OH)D3 SERPL-MCNC: 44.8 NG/ML
ALBUMIN SERPL ELPH-MCNC: 4.6 G/DL
ALP BLD-CCNC: 60 U/L
ALT SERPL-CCNC: 25 U/L
ANION GAP SERPL CALC-SCNC: 11 MMOL/L
APO A-I SERPL-MCNC: 127 MG/DL
APO A-I/APO B SERPL: 0.53 RATIO
APO B SERPL-MCNC: 67 MG/DL
APPEARANCE: CLEAR
AST SERPL-CCNC: 26 U/L
BILIRUB SERPL-MCNC: 0.5 MG/DL
BILIRUBIN URINE: NEGATIVE
BLOOD URINE: NEGATIVE
BUN SERPL-MCNC: 16 MG/DL
CALCIUM SERPL-MCNC: 9.7 MG/DL
CHLORIDE SERPL-SCNC: 103 MMOL/L
CHOLEST SERPL-MCNC: 123 MG/DL
CO2 SERPL-SCNC: 27 MMOL/L
COLOR: YELLOW
CREAT SERPL-MCNC: 1.03 MG/DL
EGFR: 77 ML/MIN/1.73M2
ESTIMATED AVERAGE GLUCOSE: 117 MG/DL
ESTRADIOL SERPL-MCNC: 25 PG/ML
GLUCOSE QUALITATIVE U: NEGATIVE MG/DL
GLUCOSE SERPL-MCNC: 114 MG/DL
HBA1C MFR BLD HPLC: 5.7 %
HCT VFR BLD CALC: 41.9 %
HDLC SERPL-MCNC: 45 MG/DL
HGB BLD-MCNC: 13.9 G/DL
KETONES URINE: NEGATIVE MG/DL
LDLC SERPL CALC-MCNC: 58 MG/DL
LEUKOCYTE ESTERASE URINE: NEGATIVE
LH SERPL-ACNC: 5.7 IU/L
MCHC RBC-ENTMCNC: 32.3 PG
MCHC RBC-ENTMCNC: 33.2 GM/DL
MCV RBC AUTO: 97.4 FL
NITRITE URINE: NEGATIVE
NONHDLC SERPL-MCNC: 78 MG/DL
PH URINE: 6.5
PLATELET # BLD AUTO: 181 K/UL
POTASSIUM SERPL-SCNC: 4.2 MMOL/L
PROLACTIN SERPL-MCNC: 10.3 NG/ML
PROT SERPL-MCNC: 7 G/DL
PROTEIN URINE: NEGATIVE MG/DL
PSA SERPL-MCNC: <0.01 NG/ML
RBC # BLD: 4.3 M/UL
RBC # FLD: 12.8 %
SODIUM SERPL-SCNC: 141 MMOL/L
SPECIFIC GRAVITY URINE: 1.01
TESTOST SERPL-MCNC: 395 NG/DL
TRIGL SERPL-MCNC: 98 MG/DL
TSH SERPL-ACNC: 2.17 UIU/ML
UROBILINOGEN URINE: 0.2 MG/DL
WBC # FLD AUTO: 6.19 K/UL

## 2023-06-01 ENCOUNTER — APPOINTMENT (OUTPATIENT)
Dept: UROLOGY | Facility: CLINIC | Age: 73
End: 2023-06-01
Payer: MEDICARE

## 2023-06-01 PROCEDURE — 99214 OFFICE O/P EST MOD 30 MIN: CPT | Mod: 95

## 2023-06-01 NOTE — HISTORY OF PRESENT ILLNESS
[FreeTextEntry1] : The patient-doctor. relationship has been established in a face-to-face fashion on real-time video audio HIPAA compliant communication using telemedicine software. The patient was at home and the physician was in his office. The patient's identity has been confirmed.  The patient was previously emailed a copy of the telemedicine consent.  The patient has had a chance to review and has now given verbal consent and has requested care to be assessed and treated through telemedicine. The patient understands there may be limitations in this process and that they need not need further follow-up care in the office and/or hospital settings. We were unable to use the [employer] platform and an alternative platform was utilized.  The patient was at home and I was in the office.\par \par Verbal consent was given on Thursday, June 1, 2023 at 10:30 AM by the patient.  It was requested by the physician.  A written consent was previously sent for the patient to sign and return.\par \par The patient returns for follow-up to review his recent blood studies and discuss options for therapy.\par \par PMH: Patient is a 72-year-old who presents with a chief complaint of erectile dysfunction.  He has a history of RRP in 2003 in [place] with Dr. Hutson, then BCR and XRT in 2008. He also had a history of bulbar stricture requiring dilation in the past, and radiation cystitis. Took patient 1 year to have return of his erections post surgery.  He states that this has been present for the past  years. Pt has been taking tadalafil, however in the past 6 months his erectile function has gotten worse which he rates as 1 out of 10 in quality. It causes both he and his partner great stress.  I reviewed the questionnaire he completed in detail. His erections are not modified with the degree of sexual stimulation. He has difficulty maintaining an erection. He describes a normal libido.  His sexual dysfunction occurs with both sexual relations and masturbation.  His erections are somewhat improved with PDE5 inhibitors.  His partner is understanding and was unable to be with him at the visit today. He is .   \par \par The patient denies fevers, chills, nausea and/or vomiting and no unexplained weight loss.  His past medical history is non-contributory.  In his present occupation he has no known toxin exposure. He does not smoke and drinks socially.  He has no known drug allergies. His review of systems and past medical history demonstrates no significant urologic issues (see patient completed questionnaire). His family history demonstrates no significant urologic issues.

## 2023-06-01 NOTE — ASSESSMENT
[FreeTextEntry1] : The patient returns for follow-up to review his recent blood studies and discuss options for therapy. Blood studies demonstrated a hematocrit of 41.9%, PSA less than 0.01 ng/mL, his ApoA-I to be ratio was 0.53.  His LH was 5.7 IU/L, vitamin D 25 was 44.8 ng/mL, testosterone 395 ng/dL, prolactin 10.3 ng/mL, TSH 2.17 µIU/mL, estradiol 25 pg/mL.\par \par He will return for a penile duplex ultrasound shortly.  We will then discuss options at that time.  He did note that over the past week or 2 he has been able to have relations with his wife using 20 mg of tadalafil.\par \par Telehealth Consultation: 30 minutes  10 minutes reviewing his history and discussing prior results.  20 minutes discussing various treatment options, writing medication prescriptions, requests for lab testing and writing his note. There was also additional time in preparing for the visit and assisting the patient with technology issues he was having with the telehealth platform.\par

## 2023-06-19 ENCOUNTER — APPOINTMENT (OUTPATIENT)
Dept: PULMONOLOGY | Facility: CLINIC | Age: 73
End: 2023-06-19
Payer: MEDICARE

## 2023-06-19 VITALS
HEART RATE: 78 BPM | RESPIRATION RATE: 15 BRPM | BODY MASS INDEX: 24.99 KG/M2 | SYSTOLIC BLOOD PRESSURE: 114 MMHG | OXYGEN SATURATION: 97 % | WEIGHT: 150 LBS | HEIGHT: 65 IN | DIASTOLIC BLOOD PRESSURE: 66 MMHG | TEMPERATURE: 98 F

## 2023-06-19 DIAGNOSIS — G47.33 OBSTRUCTIVE SLEEP APNEA (ADULT) (PEDIATRIC): ICD-10-CM

## 2023-06-19 DIAGNOSIS — R29.818 OTHER SYMPTOMS AND SIGNS INVOLVING THE NERVOUS SYSTEM: ICD-10-CM

## 2023-06-19 PROCEDURE — 99203 OFFICE O/P NEW LOW 30 MIN: CPT

## 2023-06-19 NOTE — REVIEW OF SYSTEMS
[EDS: ESS=____] : no daytime somnolence [Recent Wt Gain (___ Lbs)] : no recent weight gain [Nasal Congestion] : no nasal congestion [Snoring] : snoring [Nocturia] : nocturia [Negative] : Musculoskeletal

## 2023-06-19 NOTE — PHYSICAL EXAM
[General Appearance - Well Developed] : well developed [Normal Appearance] : normal appearance [Well Groomed] : well groomed [General Appearance - Well Nourished] : well nourished [No Deformities] : no deformities [General Appearance - In No Acute Distress] : no acute distress [Normal Conjunctiva] : the conjunctiva exhibited no abnormalities [Eyelids - No Xanthelasma] : the eyelids demonstrated no xanthelasmas [III] : III [Heart Rate And Rhythm] : heart rate was normal and rhythm regular [Heart Sounds] : normal S1 and S2 [Heart Sounds Gallop] : no gallops [Murmurs] : no murmurs [Heart Sounds Pericardial Friction Rub] : no pericardial rub [] : no respiratory distress [Auscultation Breath Sounds / Voice Sounds] : lungs were clear to auscultation bilaterally [Oriented To Time, Place, And Person] : oriented to person, place, and time [Impaired Insight] : insight and judgment were intact [Affect] : the affect was normal

## 2023-06-19 NOTE — HISTORY OF PRESENT ILLNESS
[FreeTextEntry1] : 72-year-old male who presents with a chief complaint of heavy snoring.  He denies knowledge of witnessed apneic episodes.  He states that he awakens refreshed in the morning and denies unintentional sleep episodes during the day.  While he denies difficulty initiating sleep he does report several awakenings during the night many of which are for purposes of nocturia.  He reports going to bed at 11:30 PM to 12 midnight and awakens at 6 AM.  He reports a history of prostate CA in the past with a surgical and radiation therapy treatment which has resulted in nocturia.  He denies morning headaches.  He denies excessive daytime somnolence and denies unintentional sleep episodes occurring either during periods of relative inactivity or while active during the day although he does fall asleep unintentionally in the evening.  Aside from the above he has no other significant comorbid medical conditions.

## 2023-06-19 NOTE — ASSESSMENT
[FreeTextEntry1] : 71 YO m who presents with signs and symptoms suggestive of obstructive sleep apnea including heavy snoring which is worse during supine sleep. The patient was referred to the [facility] for home sleep apnea testing for assessment of sleep disordered breathing. The ramifications of obstructive sleep apnea and its potential therapeutic modalities were discussed with the patient.

## 2023-06-22 ENCOUNTER — APPOINTMENT (OUTPATIENT)
Dept: UROLOGY | Facility: CLINIC | Age: 73
End: 2023-06-22
Payer: MEDICARE

## 2023-06-22 ENCOUNTER — OUTPATIENT (OUTPATIENT)
Dept: OUTPATIENT SERVICES | Facility: HOSPITAL | Age: 73
LOS: 1 days | End: 2023-06-22
Payer: MEDICARE

## 2023-06-22 DIAGNOSIS — Z98.89 OTHER SPECIFIED POSTPROCEDURAL STATES: Chronic | ICD-10-CM

## 2023-06-22 DIAGNOSIS — R35.0 FREQUENCY OF MICTURITION: ICD-10-CM

## 2023-06-22 DIAGNOSIS — T14.8 OTHER INJURY OF UNSPECIFIED BODY REGION: Chronic | ICD-10-CM

## 2023-06-22 PROCEDURE — 54235 NJX CORPORA CAVERNOSA RX AGT: CPT

## 2023-06-22 PROCEDURE — 93980 PENILE VASCULAR STUDY: CPT

## 2023-06-22 PROCEDURE — 93980 PENILE VASCULAR STUDY: CPT | Mod: 26

## 2023-06-22 PROCEDURE — 99214 OFFICE O/P EST MOD 30 MIN: CPT | Mod: 25

## 2023-06-22 NOTE — ASSESSMENT
[FreeTextEntry1] : The patient returns for follow-up to continue his evaluation for erectile dysfunction. He states that 20 mg of Tadalafil has been working.\par \par Blood studies demonstrated a hematocrit of 41.9%, PSA less than 0.01 ng/mL, his ApoA-I to be ratio was 0.53.  His LH was 5.7 IU/L, vitamin D 25 was 44.8 ng/mL, testosterone 395 ng/dL, prolactin 10.3 ng/mL, TSH 2.17 µIU/mL, estradiol 25 pg/mL.  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 Medical evaluation was suggested. He will continue with 20 mg of tadalafil. If that does not work as well as he would like he will return for penile injection training.\par \par Consultation: 30 minutes:  10 minutes reviewing his history and performing a physical examination.  20 minutes reviewing the ultrasound, writing for prescription medications  ,discussing treatment options and writing his note. There was also additional time in preparation for today's visit.\par

## 2023-06-26 DIAGNOSIS — N52.9 MALE ERECTILE DYSFUNCTION, UNSPECIFIED: ICD-10-CM

## 2023-06-26 NOTE — HISTORY OF PRESENT ILLNESS
[FreeTextEntry1] : The patient returns for follow-up to continue his evaluation for erectile dysfunction. He states that 20 mg of Tadalafil has been working.\par \par PMH: Patient is a 72-year-old who presents with a chief complaint of erectile dysfunction.  He has a history of RRP in 2003 in [place] with Dr. Hutson, then BCR and XRT in 2008. He also had a history of bulbar stricture requiring dilation in the past, and radiation cystitis. Took patient 1 year to have return of his erections post surgery.  He states that this has been present for the past  years. Pt has been taking tadalafil, however in the past 6 months his erectile function has gotten worse which he rates as 1 out of 10 in quality. It causes both he and his partner great stress.  I reviewed the questionnaire he completed in detail. His erections are not modified with the degree of sexual stimulation. He has difficulty maintaining an erection. He describes a normal libido.  His sexual dysfunction occurs with both sexual relations and masturbation.  His erections are somewhat improved with PDE5 inhibitors.  His partner is understanding and was unable to be with him at the visit today. He is .   \par \par The patient denies fevers, chills, nausea and/or vomiting and no unexplained weight loss.  His past medical history is non-contributory.  In his present occupation he has no known toxin exposure. He does not smoke and drinks socially.  He has no known drug allergies. His review of systems and past medical history demonstrates no significant urologic issues (see patient completed questionnaire). His family history demonstrates no significant urologic issues.  Patient unable to complete

## 2023-07-19 ENCOUNTER — OUTPATIENT (OUTPATIENT)
Dept: OUTPATIENT SERVICES | Facility: HOSPITAL | Age: 73
LOS: 1 days | End: 2023-07-19
Payer: MEDICARE

## 2023-07-19 ENCOUNTER — APPOINTMENT (OUTPATIENT)
Dept: SLEEP CENTER | Facility: CLINIC | Age: 73
End: 2023-07-19
Payer: MEDICARE

## 2023-07-19 DIAGNOSIS — Z98.89 OTHER SPECIFIED POSTPROCEDURAL STATES: Chronic | ICD-10-CM

## 2023-07-19 DIAGNOSIS — T14.8 OTHER INJURY OF UNSPECIFIED BODY REGION: Chronic | ICD-10-CM

## 2023-07-19 PROCEDURE — 95800 SLP STDY UNATTENDED: CPT

## 2023-07-19 PROCEDURE — G0400: CPT

## 2023-07-21 ENCOUNTER — NON-APPOINTMENT (OUTPATIENT)
Age: 73
End: 2023-07-21

## 2023-07-26 DIAGNOSIS — G47.33 OBSTRUCTIVE SLEEP APNEA (ADULT) (PEDIATRIC): ICD-10-CM

## 2023-09-21 ENCOUNTER — APPOINTMENT (OUTPATIENT)
Dept: UROLOGY | Facility: CLINIC | Age: 73
End: 2023-09-21
Payer: MEDICARE

## 2023-09-21 VITALS
HEART RATE: 73 BPM | RESPIRATION RATE: 17 BRPM | SYSTOLIC BLOOD PRESSURE: 106 MMHG | BODY MASS INDEX: 24.49 KG/M2 | DIASTOLIC BLOOD PRESSURE: 63 MMHG | HEIGHT: 65 IN | WEIGHT: 147 LBS

## 2023-09-21 DIAGNOSIS — N39.3 STRESS INCONTINENCE (FEMALE) (MALE): ICD-10-CM

## 2023-09-21 DIAGNOSIS — R35.0 FREQUENCY OF MICTURITION: ICD-10-CM

## 2023-09-21 PROCEDURE — 99214 OFFICE O/P EST MOD 30 MIN: CPT

## 2023-09-26 ENCOUNTER — APPOINTMENT (OUTPATIENT)
Dept: DERMATOLOGY | Facility: CLINIC | Age: 73
End: 2023-09-26
Payer: MEDICARE

## 2023-09-26 DIAGNOSIS — L82.0 INFLAMED SEBORRHEIC KERATOSIS: ICD-10-CM

## 2023-09-26 DIAGNOSIS — D22.9 MELANOCYTIC NEVI, UNSPECIFIED: ICD-10-CM

## 2023-09-26 DIAGNOSIS — L57.0 ACTINIC KERATOSIS: ICD-10-CM

## 2023-09-26 PROCEDURE — 99213 OFFICE O/P EST LOW 20 MIN: CPT | Mod: 25

## 2023-09-26 PROCEDURE — 17000 DESTRUCT PREMALG LESION: CPT | Mod: 59

## 2023-09-26 PROCEDURE — 17110 DESTRUCTION B9 LES UP TO 14: CPT | Mod: 59

## 2024-01-22 PROBLEM — N52.9 ERECTILE DYSFUNCTION: Status: ACTIVE | Noted: 2019-11-21

## 2024-01-23 ENCOUNTER — APPOINTMENT (OUTPATIENT)
Dept: UROLOGY | Facility: CLINIC | Age: 74
End: 2024-01-23
Payer: MEDICARE

## 2024-01-23 DIAGNOSIS — N52.9 MALE ERECTILE DYSFUNCTION, UNSPECIFIED: ICD-10-CM

## 2024-01-23 PROCEDURE — 99214 OFFICE O/P EST MOD 30 MIN: CPT

## 2024-01-23 PROCEDURE — G2211 COMPLEX E/M VISIT ADD ON: CPT

## 2024-01-23 RX ORDER — TADALAFIL 20 MG/1
20 TABLET ORAL
Qty: 24 | Refills: 2 | Status: ACTIVE | COMMUNITY
Start: 2021-09-30 | End: 1900-01-01

## 2024-01-23 NOTE — HISTORY OF PRESENT ILLNESS
[FreeTextEntry1] : The patient-doctor. relationship has been established in a face-to-face fashion on real-time video audio HIPAA compliant communication using telemedicine software. The patient was at home and the physician was in his office. The patient's identity has been confirmed.  The patient was previously emailed a copy of the telemedicine consent.  The patient has had a chance to review and has now given verbal consent and has requested care to be assessed and treated through telemedicine. The patient understands there may be limitations in this process and that they need not need further follow-up care in the office and/or hospital settings. We were unable to use the [employer] platform and an alternative platform was utilized.  The patient was at home and I was in the office.  Verbal consent was given on Tuesday, January 23, 2024 at 9 AM by the patient.  It was requested by the physician.  A written consent was previously sent for the patient to sign and return.  The patient returns for follow-up to continue his evaluation for erectile dysfunction. He states that 20 mg of Tadalafil has been working.  PMH: Patient is a 72-year-old who presents with a chief complaint of erectile dysfunction.  He has a history of RRP in 2003 in [place] with Dr. Hutson, then BCR and XRT in 2008. He also had a history of bulbar stricture requiring dilation in the past, and radiation cystitis. Took patient 1 year to have return of his erections post surgery.  He states that this has been present for the past  years. Pt has been taking tadalafil, however in the past 6 months his erectile function has gotten worse which he rates as 1 out of 10 in quality. It causes both he and his partner great stress.  I reviewed the questionnaire he completed in detail. His erections are not modified with the degree of sexual stimulation. He has difficulty maintaining an erection. He describes a normal libido.  His sexual dysfunction occurs with both sexual relations and masturbation.  His erections are somewhat improved with PDE5 inhibitors.  His partner is understanding and was unable to be with him at the visit today. He is .     The patient denies fevers, chills, nausea and/or vomiting and no unexplained weight loss.  His past medical history is non-contributory.  In his present occupation he has no known toxin exposure. He does not smoke and drinks socially.  He has no known drug allergies. His review of systems and past medical history demonstrates no significant urologic issues (see patient completed questionnaire). His family history demonstrates no significant urologic issues.

## 2024-01-23 NOTE — ASSESSMENT
[FreeTextEntry1] : The patient returns for follow-up to continue his evaluation for erectile dysfunction. He states that 20 mg of Tadalafil has been working.  May 18, 2023 blood studies demonstrated a hematocrit of 41.9%, PSA less than 0.01 ng/mL, his ApoA-I to be ratio was 0.53. His LH was 5.7 IU/L, vitamin D 25 was 44.8 ng/mL, testosterone 395 ng/dL, prolactin 10.3 ng/mL, TSH 2.17 IU/mL, estradiol 25 pg/mL. He did note that over the past week or 2 he has been able to have relations with his wife using 20 mg of tadalafil.  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 Medical evaluation was suggested. He will continue with 20 mg of tadalafil. If that does not work as well as he would like he will return for penile injection training.  I have renewed his tadalafil we will see him back in 3 to 6 months in follow-up.  Telehealth Consultation: 30 minutes  10 minutes reviewing his history and discussing prior results.  20 minutes discussing various treatment options, writing medication prescriptions and writing his note. There was also additional time in preparing for the visit and assisting the patient with technology issues he was having with the telehealth platform.

## 2024-02-16 ENCOUNTER — APPOINTMENT (OUTPATIENT)
Dept: DERMATOLOGY | Facility: CLINIC | Age: 74
End: 2024-02-16
Payer: MEDICARE

## 2024-02-16 DIAGNOSIS — D22.9 MELANOCYTIC NEVI, UNSPECIFIED: ICD-10-CM

## 2024-02-16 DIAGNOSIS — L82.1 OTHER SEBORRHEIC KERATOSIS: ICD-10-CM

## 2024-02-16 DIAGNOSIS — Z12.83 ENCOUNTER FOR SCREENING FOR MALIGNANT NEOPLASM OF SKIN: ICD-10-CM

## 2024-02-16 PROCEDURE — 99213 OFFICE O/P EST LOW 20 MIN: CPT

## 2024-02-16 NOTE — HISTORY OF PRESENT ILLNESS
[FreeTextEntry1] : f/u moles [de-identified] : 73 year old man with PMH AKs here for eval of   1. Moles, brown. On body, x yrs, constant, no aggrav or relieving sxs. No personal history of skin cancer. Previously treated AKs with 5FU.  SH:

## 2024-02-16 NOTE — PHYSICAL EXAM
[FreeTextEntry3] : The patient was alert and oriented X 3, well nourished, and in no apparent distress. Oropharynx showed no ulcerations. There was no visible lymphadenopathy. Conjunctiva were non-injected. There was no clubbing or edema of extremities. The scalp, hair, face, eyebrows, lips, oropharynx , neck, chest, abdomen, back, buttocks, extremities X 4, hands, feet, nails were examined. Patient deferred genital examination. There was no hyperhidrosis or bromhidrosis. The following lesions are noted: - scattered brown macules with fairly regular borders, uniformly colored on trunk and extremities - scattered well demarcated stuck on appearing brown plaques on the trunk and extremities

## 2024-02-26 ENCOUNTER — OUTPATIENT (OUTPATIENT)
Dept: OUTPATIENT SERVICES | Facility: HOSPITAL | Age: 74
LOS: 1 days | End: 2024-02-26
Payer: MEDICARE

## 2024-02-26 ENCOUNTER — APPOINTMENT (OUTPATIENT)
Dept: INTERNAL MEDICINE | Facility: CLINIC | Age: 74
End: 2024-02-26
Payer: MEDICARE

## 2024-02-26 VITALS
WEIGHT: 150 LBS | HEIGHT: 66 IN | SYSTOLIC BLOOD PRESSURE: 110 MMHG | DIASTOLIC BLOOD PRESSURE: 60 MMHG | OXYGEN SATURATION: 98 % | BODY MASS INDEX: 24.11 KG/M2 | HEART RATE: 78 BPM

## 2024-02-26 DIAGNOSIS — Z98.89 OTHER SPECIFIED POSTPROCEDURAL STATES: Chronic | ICD-10-CM

## 2024-02-26 DIAGNOSIS — Z23 ENCOUNTER FOR IMMUNIZATION: ICD-10-CM

## 2024-02-26 DIAGNOSIS — T14.8 OTHER INJURY OF UNSPECIFIED BODY REGION: Chronic | ICD-10-CM

## 2024-02-26 DIAGNOSIS — E78.00 PURE HYPERCHOLESTEROLEMIA, UNSPECIFIED: ICD-10-CM

## 2024-02-26 DIAGNOSIS — Z00.00 ENCOUNTER FOR GENERAL ADULT MEDICAL EXAMINATION W/OUT ABNORMAL FINDINGS: ICD-10-CM

## 2024-02-26 DIAGNOSIS — I10 ESSENTIAL (PRIMARY) HYPERTENSION: ICD-10-CM

## 2024-02-26 DIAGNOSIS — Z85.46 PERSONAL HISTORY OF MALIGNANT NEOPLASM OF PROSTATE: ICD-10-CM

## 2024-02-26 DIAGNOSIS — R73.09 OTHER ABNORMAL GLUCOSE: ICD-10-CM

## 2024-02-26 PROCEDURE — 90677 PCV20 VACCINE IM: CPT

## 2024-02-26 PROCEDURE — G0439: CPT

## 2024-02-26 PROCEDURE — G0009: CPT

## 2024-02-26 RX ORDER — PAPAVERINE HYDROCHLORIDE 30 MG/ML
30 INJECTION, SOLUTION INTRAVENOUS
Qty: 2 | Refills: 0 | Status: DISCONTINUED | COMMUNITY
Start: 2023-05-18 | End: 2024-02-26

## 2024-03-04 DIAGNOSIS — Z00.00 ENCOUNTER FOR GENERAL ADULT MEDICAL EXAMINATION WITHOUT ABNORMAL FINDINGS: ICD-10-CM

## 2024-03-04 DIAGNOSIS — Z23 ENCOUNTER FOR IMMUNIZATION: ICD-10-CM

## 2024-03-04 DIAGNOSIS — R73.09 OTHER ABNORMAL GLUCOSE: ICD-10-CM

## 2024-03-04 DIAGNOSIS — Z85.46 PERSONAL HISTORY OF MALIGNANT NEOPLASM OF PROSTATE: ICD-10-CM

## 2024-03-04 DIAGNOSIS — E78.00 PURE HYPERCHOLESTEROLEMIA, UNSPECIFIED: ICD-10-CM

## 2024-04-02 ENCOUNTER — OFFICE (OUTPATIENT)
Dept: URBAN - METROPOLITAN AREA CLINIC 29 | Facility: CLINIC | Age: 74
Setting detail: OPHTHALMOLOGY
End: 2024-04-02
Payer: MEDICARE

## 2024-04-02 DIAGNOSIS — H01.001: ICD-10-CM

## 2024-04-02 DIAGNOSIS — H01.004: ICD-10-CM

## 2024-04-02 DIAGNOSIS — H40.033: ICD-10-CM

## 2024-04-02 DIAGNOSIS — H43.22: ICD-10-CM

## 2024-04-02 PROCEDURE — 92020 GONIOSCOPY: CPT | Performed by: OPHTHALMOLOGY

## 2024-04-02 PROCEDURE — 99214 OFFICE O/P EST MOD 30 MIN: CPT | Performed by: OPHTHALMOLOGY

## 2024-04-02 ASSESSMENT — LID EXAM ASSESSMENTS
OD_BLEPHARITIS: 2+
OS_BLEPHARITIS: 2+

## 2024-04-26 ENCOUNTER — RX RENEWAL (OUTPATIENT)
Age: 74
End: 2024-04-26

## 2024-05-02 ENCOUNTER — OFFICE (OUTPATIENT)
Dept: URBAN - METROPOLITAN AREA CLINIC 29 | Facility: CLINIC | Age: 74
Setting detail: OPHTHALMOLOGY
End: 2024-05-02
Payer: MEDICARE

## 2024-05-02 DIAGNOSIS — H40.032: ICD-10-CM

## 2024-05-02 PROCEDURE — 66761 REVISION OF IRIS: CPT | Mod: LT | Performed by: OPHTHALMOLOGY

## 2024-05-02 ASSESSMENT — CONFRONTATIONAL VISUAL FIELD TEST (CVF)
OD_FINDINGS: FULL
OS_FINDINGS: FULL

## 2024-05-02 ASSESSMENT — LID EXAM ASSESSMENTS
OS_BLEPHARITIS: 2+
OD_BLEPHARITIS: 2+

## 2024-05-20 ENCOUNTER — APPOINTMENT (OUTPATIENT)
Dept: GASTROENTEROLOGY | Facility: CLINIC | Age: 74
End: 2024-05-20
Payer: MEDICARE

## 2024-05-20 VITALS
OXYGEN SATURATION: 96 % | BODY MASS INDEX: 24.49 KG/M2 | SYSTOLIC BLOOD PRESSURE: 100 MMHG | HEART RATE: 70 BPM | DIASTOLIC BLOOD PRESSURE: 65 MMHG | HEIGHT: 65 IN | WEIGHT: 147 LBS

## 2024-05-20 DIAGNOSIS — K62.5 HEMORRHAGE OF ANUS AND RECTUM: ICD-10-CM

## 2024-05-20 DIAGNOSIS — Z12.11 ENCOUNTER FOR SCREENING FOR MALIGNANT NEOPLASM OF COLON: ICD-10-CM

## 2024-05-20 DIAGNOSIS — D12.6 BENIGN NEOPLASM OF COLON, UNSPECIFIED: ICD-10-CM

## 2024-05-20 PROCEDURE — 99214 OFFICE O/P EST MOD 30 MIN: CPT

## 2024-05-20 RX ORDER — SODIUM SULFATE, POTASSIUM SULFATE AND MAGNESIUM SULFATE 1.6; 3.13; 17.5 G/177ML; G/177ML; G/177ML
17.5-3.13-1.6 SOLUTION ORAL
Qty: 1 | Refills: 0 | Status: ACTIVE | COMMUNITY
Start: 2024-05-20 | End: 1900-01-01

## 2024-05-20 NOTE — ASSESSMENT
[FreeTextEntry1] : This is a pleasant 73-year-old male with history of radiation proctitis, history of adenomatous colon polyps, presenting with symptoms of rectal bleeding most likely due to radiation proctitis.  I recommend colonoscopy to be scheduled at [facility].  I explained to him risk benefits of a colonoscopy.  Risk including but not limited to bleeding, perforation, infection and adverse medication reaction.  Questions were answered.  He stated understanding.

## 2024-05-20 NOTE — HISTORY OF PRESENT ILLNESS
[FreeTextEntry1] : Ricardo presents for follow-up visit.  He has noticed more rectal bleeding since last seen in the office.  He denies constipation.  He denies diarrhea.  He denies abdominal pain.  He underwent a colonoscopy in 2020 for which she was found to have several adenomatous colon polyps and radiation proctitis.

## 2024-05-20 NOTE — PHYSICAL EXAM

## 2024-05-30 ENCOUNTER — TRANSCRIPTION ENCOUNTER (OUTPATIENT)
Age: 74
End: 2024-05-30

## 2024-05-30 LAB
ALBUMIN SERPL ELPH-MCNC: 4.6 G/DL
ALP BLD-CCNC: 61 U/L
ALT SERPL-CCNC: 18 U/L
ANION GAP SERPL CALC-SCNC: 12 MMOL/L
APPEARANCE: CLEAR
AST SERPL-CCNC: 26 U/L
BACTERIA: NEGATIVE /HPF
BILIRUB SERPL-MCNC: 0.4 MG/DL
BILIRUBIN URINE: NEGATIVE
BLOOD URINE: NEGATIVE
BUN SERPL-MCNC: 15 MG/DL
CALCIUM SERPL-MCNC: 9.4 MG/DL
CAST: 0 /LPF
CHLORIDE SERPL-SCNC: 101 MMOL/L
CHOLEST SERPL-MCNC: 145 MG/DL
CO2 SERPL-SCNC: 27 MMOL/L
COLOR: YELLOW
CREAT SERPL-MCNC: 1.06 MG/DL
EGFR: 74 ML/MIN/1.73M2
EPITHELIAL CELLS: 0 /HPF
ESTIMATED AVERAGE GLUCOSE: 117 MG/DL
GLUCOSE QUALITATIVE U: NEGATIVE MG/DL
GLUCOSE SERPL-MCNC: 124 MG/DL
HBA1C MFR BLD HPLC: 5.7 %
HCT VFR BLD CALC: 43.9 %
HDLC SERPL-MCNC: 50 MG/DL
HGB BLD-MCNC: 14.3 G/DL
KETONES URINE: NEGATIVE MG/DL
LDLC SERPL CALC-MCNC: 83 MG/DL
LEUKOCYTE ESTERASE URINE: NEGATIVE
MCHC RBC-ENTMCNC: 32.4 PG
MCHC RBC-ENTMCNC: 32.6 GM/DL
MCV RBC AUTO: 99.3 FL
MICROSCOPIC-UA: NORMAL
NITRITE URINE: NEGATIVE
NONHDLC SERPL-MCNC: 95 MG/DL
PH URINE: 8
PLATELET # BLD AUTO: 170 K/UL
POTASSIUM SERPL-SCNC: 4.5 MMOL/L
PROT SERPL-MCNC: 7 G/DL
PROTEIN URINE: NORMAL MG/DL
PSA SERPL-MCNC: <0.01 NG/ML
RBC # BLD: 4.42 M/UL
RBC # FLD: 13 %
RED BLOOD CELLS URINE: 1 /HPF
SODIUM SERPL-SCNC: 140 MMOL/L
SPECIFIC GRAVITY URINE: 1.02
TRIGL SERPL-MCNC: 60 MG/DL
UROBILINOGEN URINE: 0.2 MG/DL
WBC # FLD AUTO: 7.28 K/UL
WHITE BLOOD CELLS URINE: 0 /HPF

## 2024-05-30 NOTE — ASSESSMENT
[FreeTextEntry1] : 74 yo man with h/o as above including prostate cancer s/p prostatectomy and radiation treatment complicated by slight urinary incontinence, slight rectal leakage, and erectile dysfunction, also with hyperlipidemia and preDM, here for CPE. 1.  CV - bp at goal, will check lipids few months when due 2.   - following with  for h/o prostate cancer and ED, will get PSA few months; check UA as well given fam hx bladder cancer 3.  GI - following with GI for rectal bleeding and will ask about another colonoscopy 4.  Endo - check a1c with next labs few months 5.  HCM - check other below labs; prevnar 20 today, other vaccines utd 6.  RTO prn or 1 year

## 2024-05-30 NOTE — HEALTH RISK ASSESSMENT
[Fully functional (bathing, dressing, toileting, transferring, walking, feeding)] : Fully functional (bathing, dressing, toileting, transferring, walking, feeding) [Fully functional (using the telephone, shopping, preparing meals, housekeeping, doing laundry, using] : Fully functional and needs no help or supervision to perform IADLs (using the telephone, shopping, preparing meals, housekeeping, doing laundry, using transportation, managing medications and managing finances) [Designated Healthcare Proxy] : Designated healthcare proxy [One fall no injury in past year] : Patient reported one fall in the past year without injury [Never] : Never [de-identified] : mechanical fall from tripping [ColonoscopyDate] : 10/20 [ColonoscopyComments] : utd - due 5 years [AdvancecareDate] : 02/24

## 2024-05-30 NOTE — HISTORY OF PRESENT ILLNESS
[FreeTextEntry1] : physical [de-identified] : 72 yo man with h/o as below here for CPE. Feeling well, no complaints. Still works out with  twice/week. Seeing 2 urologists, also will see GI about another colonoscopy, still has continued rectal bleeding with bowel movements, still has slight rectal leakage, still has slight urinary leakage as well.   also referred him to pelvic floor PT.  Will have urinary urgency as well, might be related to coffee intake in the morning as well.   Changed dentist and optho.   Also sees dermatologist annually for skin check.   No other active issues.  Had flu shot, covid booster and rsv vaccine this year.

## 2024-05-30 NOTE — REVIEW OF SYSTEMS
[Negative] : Eyes [Fever] : no fever [Chills] : no chills [Fatigue] : no fatigue [Recent Change In Weight] : ~T no recent weight change [Chest Pain] : no chest pain [Palpitations] : no palpitations [Shortness Of Breath] : no shortness of breath [Cough] : no cough [Dyspnea on Exertion] : not dyspnea on exertion [Abdominal Pain] : no abdominal pain [Nausea] : no nausea [Constipation] : no constipation [Vomiting] : no vomiting [Heartburn] : no heartburn [Skin Rash] : no skin rash [Headache] : no headache [Dizziness] : no dizziness [Fainting] : no fainting [Anxiety] : no anxiety [Depression] : no depression [Easy Bleeding] : no easy bleeding [Easy Bruising] : no easy bruising [FreeTextEntry4] : see hpi, some difficulty breathing out of one nostril [FreeTextEntry7] : see hpi [FreeTextEntry8] : see hpi

## 2024-05-30 NOTE — PHYSICAL EXAM
[No Acute Distress] : no acute distress [Well Nourished] : well nourished [Well Developed] : well developed [Well-Appearing] : well-appearing [PERRL] : pupils equal round and reactive to light [EOMI] : extraocular movements intact [Normal Oropharynx] : the oropharynx was normal [Normal TMs] : both tympanic membranes were normal [No Lymphadenopathy] : no lymphadenopathy [Supple] : supple [Thyroid Normal, No Nodules] : the thyroid was normal and there were no nodules present [No Respiratory Distress] : no respiratory distress  [No Accessory Muscle Use] : no accessory muscle use [Clear to Auscultation] : lungs were clear to auscultation bilaterally [Normal Rate] : normal rate  [Regular Rhythm] : with a regular rhythm [Normal S1, S2] : normal S1 and S2 [No Murmur] : no murmur heard [No Carotid Bruits] : no carotid bruits [Pedal Pulses Present] : the pedal pulses are present [No Edema] : there was no peripheral edema [Normal Appearance] : normal in appearance [Non Tender] : non-tender [Soft] : abdomen soft [Non-distended] : non-distended [No Masses] : no abdominal mass palpated [No HSM] : no HSM [Normal Bowel Sounds] : normal bowel sounds [Normal Supraclavicular Nodes] : no supraclavicular lymphadenopathy [Normal Posterior Cervical Nodes] : no posterior cervical lymphadenopathy [No Joint Swelling] : no joint swelling [Normal Anterior Cervical Nodes] : no anterior cervical lymphadenopathy [No Rash] : no rash [Normal Affect] : the affect was normal [Normal Gait] : normal gait [de-identified] : bp 110/60  HR 78 [de-identified] : scattered skin-colored papules

## 2024-05-30 NOTE — ADDENDUM
[FreeTextEntry1] : 5/30/24:  Reviewed labs, nl except LDL 83 at goal on statin, A1c 5.7 c/w stable preDM to work on diet/exercise, other labs nl - messaged to pt.

## 2024-06-03 ENCOUNTER — TRANSCRIPTION ENCOUNTER (OUTPATIENT)
Age: 74
End: 2024-06-03

## 2024-06-04 ENCOUNTER — OFFICE (OUTPATIENT)
Dept: URBAN - METROPOLITAN AREA CLINIC 29 | Facility: CLINIC | Age: 74
Setting detail: OPHTHALMOLOGY
End: 2024-06-04
Payer: MEDICARE

## 2024-06-04 DIAGNOSIS — H52.4: ICD-10-CM

## 2024-06-04 DIAGNOSIS — H01.004: ICD-10-CM

## 2024-06-04 DIAGNOSIS — H40.033: ICD-10-CM

## 2024-06-04 DIAGNOSIS — H01.001: ICD-10-CM

## 2024-06-04 PROCEDURE — 92015 DETERMINE REFRACTIVE STATE: CPT | Performed by: OPHTHALMOLOGY

## 2024-06-04 PROCEDURE — 99213 OFFICE O/P EST LOW 20 MIN: CPT | Performed by: OPHTHALMOLOGY

## 2024-06-04 PROCEDURE — 92020 GONIOSCOPY: CPT | Performed by: OPHTHALMOLOGY

## 2024-06-04 ASSESSMENT — LID EXAM ASSESSMENTS
OD_BLEPHARITIS: 2+
OS_BLEPHARITIS: 2+

## 2024-06-04 ASSESSMENT — CONFRONTATIONAL VISUAL FIELD TEST (CVF)
OD_FINDINGS: FULL
OS_FINDINGS: FULL

## 2024-06-10 ENCOUNTER — APPOINTMENT (OUTPATIENT)
Dept: GASTROENTEROLOGY | Facility: CLINIC | Age: 74
End: 2024-06-10

## 2024-07-12 ENCOUNTER — APPOINTMENT (OUTPATIENT)
Dept: GASTROENTEROLOGY | Facility: HOSPITAL | Age: 74
End: 2024-07-12

## 2024-07-12 ENCOUNTER — TRANSCRIPTION ENCOUNTER (OUTPATIENT)
Age: 74
End: 2024-07-12

## 2024-07-12 ENCOUNTER — RESULT REVIEW (OUTPATIENT)
Age: 74
End: 2024-07-12

## 2024-07-12 ENCOUNTER — OUTPATIENT (OUTPATIENT)
Dept: OUTPATIENT SERVICES | Facility: HOSPITAL | Age: 74
LOS: 1 days | End: 2024-07-12
Payer: MEDICARE

## 2024-07-12 VITALS
DIASTOLIC BLOOD PRESSURE: 60 MMHG | WEIGHT: 145.06 LBS | TEMPERATURE: 97 F | OXYGEN SATURATION: 100 % | HEART RATE: 67 BPM | HEIGHT: 65 IN | SYSTOLIC BLOOD PRESSURE: 119 MMHG | RESPIRATION RATE: 16 BRPM

## 2024-07-12 VITALS
DIASTOLIC BLOOD PRESSURE: 72 MMHG | SYSTOLIC BLOOD PRESSURE: 168 MMHG | RESPIRATION RATE: 16 BRPM | OXYGEN SATURATION: 100 % | HEART RATE: 59 BPM

## 2024-07-12 DIAGNOSIS — K62.5 HEMORRHAGE OF ANUS AND RECTUM: ICD-10-CM

## 2024-07-12 DIAGNOSIS — Z98.89 UNDEFINED: Chronic | ICD-10-CM

## 2024-07-12 DIAGNOSIS — D12.6 BENIGN NEOPLASM OF COLON, UNSPECIFIED: ICD-10-CM

## 2024-07-12 DIAGNOSIS — K62.7 RADIATION PROCTITIS: ICD-10-CM

## 2024-07-12 DIAGNOSIS — T14.8 OTHER INJURY OF UNSPECIFIED BODY REGION: Chronic | ICD-10-CM

## 2024-07-12 PROCEDURE — 45385 COLONOSCOPY W/LESION REMOVAL: CPT | Mod: GC

## 2024-07-12 PROCEDURE — 88305 TISSUE EXAM BY PATHOLOGIST: CPT | Mod: 26

## 2024-07-12 PROCEDURE — 45382 COLONOSCOPY W/CONTROL BLEED: CPT | Mod: 59,GC

## 2024-07-12 PROCEDURE — 88305 TISSUE EXAM BY PATHOLOGIST: CPT

## 2024-07-12 PROCEDURE — 45385 COLONOSCOPY W/LESION REMOVAL: CPT | Mod: XS

## 2024-07-12 PROCEDURE — 45388 COLONOSCOPY W/ABLATION: CPT

## 2024-07-12 DEVICE — NET RETRV ROT ROTH 2.5MMX230CM: Type: IMPLANTABLE DEVICE | Status: FUNCTIONAL

## 2024-07-12 RX ORDER — SODIUM CHLORIDE 0.9 % (FLUSH) 0.9 %
500 SYRINGE (ML) INJECTION
Refills: 0 | Status: ACTIVE | OUTPATIENT
Start: 2024-07-12

## 2024-07-12 NOTE — PRE PROCEDURE NOTE - PRE PROCEDURE EVALUATION
Attending Physician:             Radha Baldwin               Procedure: colonoscopy    Indication for Procedure: rectal bleed, hx of adenomatous colon polyp  ________________________________________________________  PAST MEDICAL & SURGICAL HISTORY:  Urethral stricture      Hyperlipidemia      Prostate cancer  s/p prostatectomy 2003/ radiation 2008      Syncope  1993  related to stress      ED (erectile dysfunction)  cialis daily      Fracture  right cheekbone s/p orif  1978      S/P prostatectomy  2003      S/P hernia repair  left with mesh 1993        ALLERGIES:  No Known Allergies    HOME MEDICATIONS:  atorvastatin 10 mg oral tablet: 1 tab(s) orally once a day  tadalafil 5 mg oral tablet: 1 tab(s) orally once a day  Vitamin D3: 1  orally once a day  Zenpep 25,000 units-136,000 units-85,000 units oral delayed release capsule: 1 cap(s) orally 3 times a day    AICD/PPM: [ ] yes   [ ] no    PERTINENT LAB DATA:                      PHYSICAL EXAMINATION:    Height (cm): 165.1  Weight (kg): 65.8  BMI (kg/m2): 24.1  BSA (m2): 1.73T(C): 36.2  HR: 67  BP: 119/60  RR: 16  SpO2: 100%    Constitutional: NAD  HEENT: PERRLA, EOMI,    Neck:  No JVD  Respiratory: CTAB/L  Cardiovascular: S1 and S2  Gastrointestinal: BS+, soft, NT/ND  Extremities: No peripheral edema  Neurological: A/O x 3, no focal deficits  Psychiatric: Normal mood, normal affect  Skin: No rashes    ASA Class: I [ ]  II [ ]  III [ ]  IV [ ]    COMMENTS:    The patient is a suitable candidate for the planned procedure unless box checked [ ]  No, explain:

## 2024-07-15 LAB — SURGICAL PATHOLOGY STUDY: SIGNIFICANT CHANGE UP

## 2024-08-20 ENCOUNTER — APPOINTMENT (OUTPATIENT)
Dept: UROLOGY | Facility: CLINIC | Age: 74
End: 2024-08-20
Payer: MEDICARE

## 2024-08-20 PROCEDURE — G2211 COMPLEX E/M VISIT ADD ON: CPT

## 2024-08-20 PROCEDURE — 99214 OFFICE O/P EST MOD 30 MIN: CPT

## 2024-08-20 NOTE — ASSESSMENT
[FreeTextEntry1] : The patient returns for follow-up to continue his evaluation for erectile dysfunction.  He was last seen January 2024.  He states that 20 mg of Tadalafil has been working.  Blood studies demonstrated a hematocrit of 41.9%, PSA less than 0.01 ng/mL, his ApoA-I to be ratio was 0.53.  His LH was 5.7 IU/L, vitamin D 25 was 44.8 ng/mL, testosterone 395 ng/dL, prolactin 10.3 ng/mL, TSH 2.17 IU/mL, estradiol 25 pg/mL.  He did note that over the past week or 2 he has been able to have relations with his wife using 20 mg of tadalafil.  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 Medical evaluation was suggested. He will continue with 20 mg of tadalafil.  He feel his stress in the past has made tadalafil less effective.  However, since his stress has decreased markedly his erections are much better with the tadalafil.  If that does not work as well as he would like he will return for penile injection training.  Telehealth Consultation: 35 minutes reviewing his history and discussing prior results, discussing various treatment options, reviewing his medical history and writing his note. There was also additional time in preparing for the visit and assisting the patient with technology issues he was having with the telehealth platform.

## 2024-08-20 NOTE — HISTORY OF PRESENT ILLNESS
[FreeTextEntry1] : The patient-doctor. relationship has been established in a face-to-face fashion on real-time video audio HIPAA compliant communication using telemedicine software. The patient, Ricardo Romo was at home and participated in the telephonic visit with the Physician Francisco Kirby MD PhD who was in his medical office. The patient's identity has been confirmed.  The patient was previously emailed a copy of the telemedicine consent.  The patient has had a chance to review and has now given verbal consent and has requested care to be assessed and treated through telemedicine. The patient understands there may be limitations in this process and that they need not need further follow-up care in the office and/or hospital settings.   Verbal consent was given on Tuesday, August 20, 2024 at 9 AM by the patient.  It was requested by the physician.  A written consent was previously sent for the patient to sign and return.  The patient returns for follow-up to continue his evaluation for erectile dysfunction.  He was last seen January 2024.  He states that 20 mg of Tadalafil has been working.  PMH: Patient initially presented with a chief complaint of erectile dysfunction.  He has a history of RRP in 2003 in [place] with Dr. Hutson, then BCR and XRT in 2008. He also had a history of bulbar stricture requiring dilation in the past, and radiation cystitis. Took patient 1 year to have return of his erections post surgery.  He states that this has been present for the past  years. Pt has been taking tadalafil, however in the past 6 months his erectile function has gotten worse which he rates as 1 out of 10 in quality. It causes both he and his partner great stress.  I reviewed the questionnaire he completed in detail. His erections are not modified with the degree of sexual stimulation. He has difficulty maintaining an erection. He describes a normal libido.  His sexual dysfunction occurs with both sexual relations and masturbation.  His erections are somewhat improved with PDE5 inhibitors.  His partner is understanding and was unable to be with him at the visit today. He is .     The patient denies fevers, chills, nausea and/or vomiting and no unexplained weight loss.  His past medical history is non-contributory.  In his present occupation he has no known toxin exposure. He does not smoke and drinks socially.  He has no known drug allergies. His review of systems and past medical history demonstrates no significant urologic issues (see patient completed questionnaire). His family history demonstrates no significant urologic issues.

## 2024-08-20 NOTE — HISTORY OF PRESENT ILLNESS
[FreeTextEntry1] : The patient-doctor. relationship has been established in a face-to-face fashion on real-time video audio HIPAA compliant communication using telemedicine software. The patient, Ricardo Romo was at home and participated in the telephonic visit with the Physician Francisco Kirby MD PhD who was in his medical office. The patient's identity has been confirmed.  The patient was previously emailed a copy of the telemedicine consent.  The patient has had a chance to review and has now given verbal consent and has requested care to be assessed and treated through telemedicine. The patient understands there may be limitations in this process and that they need not need further follow-up care in the office and/or hospital settings.   Verbal consent was given on Tuesday, August 20, 2024 at 9 AM by the patient.  It was requested by the physician.  A written consent was previously sent for the patient to sign and return.  The patient returns for follow-up to continue his evaluation for erectile dysfunction.  He was last seen January 2024.  He states that 20 mg of Tadalafil has been working.  PMH: Patient initially presented with a chief complaint of erectile dysfunction.  He has a history of RRP in 2003 in [place] with Dr. Hutson, then BCR and XRT in 2008. He also had a history of bulbar stricture requiring dilation in the past, and radiation cystitis. Took patient 1 year to have return of his erections post surgery.  He states that this has been present for the past  years. Pt has been taking tadalafil, however in the past 6 months his erectile function has gotten worse which he rates as 1 out of 10 in quality. It causes both he and his partner great stress.  I reviewed the questionnaire he completed in detail. His erections are not modified with the degree of sexual stimulation. He has difficulty maintaining an erection. He describes a normal libido.  His sexual dysfunction occurs with both sexual relations and masturbation.  His erections are somewhat improved with PDE5 inhibitors.  His partner is understanding and was unable to be with him at the visit today. He is .     The patient denies fevers, chills, nausea and/or vomiting and no unexplained weight loss.  His past medical history is non-contributory.  In his present occupation he has no known toxin exposure. He does not smoke and drinks socially.  He has no known drug allergies. His review of systems and past medical history demonstrates no significant urologic issues (see patient completed questionnaire). His family history demonstrates no significant urologic issues.

## 2024-09-10 ENCOUNTER — OUTPATIENT (OUTPATIENT)
Dept: OUTPATIENT SERVICES | Facility: HOSPITAL | Age: 74
LOS: 1 days | End: 2024-09-10
Payer: MEDICARE

## 2024-09-10 ENCOUNTER — APPOINTMENT (OUTPATIENT)
Dept: INTERNAL MEDICINE | Facility: CLINIC | Age: 74
End: 2024-09-10

## 2024-09-10 DIAGNOSIS — Z98.89 OTHER SPECIFIED POSTPROCEDURAL STATES: Chronic | ICD-10-CM

## 2024-09-10 DIAGNOSIS — T14.8 OTHER INJURY OF UNSPECIFIED BODY REGION: Chronic | ICD-10-CM

## 2024-09-10 PROCEDURE — G0010: CPT

## 2024-09-10 PROCEDURE — 90636 HEP A/HEP B VACC ADULT IM: CPT

## 2024-09-11 DIAGNOSIS — Z23 ENCOUNTER FOR IMMUNIZATION: ICD-10-CM

## 2024-09-11 DIAGNOSIS — R73.09 OTHER ABNORMAL GLUCOSE: ICD-10-CM

## 2024-09-11 DIAGNOSIS — Z00.00 ENCOUNTER FOR GENERAL ADULT MEDICAL EXAMINATION WITHOUT ABNORMAL FINDINGS: ICD-10-CM

## 2024-09-11 DIAGNOSIS — Z85.46 PERSONAL HISTORY OF MALIGNANT NEOPLASM OF PROSTATE: ICD-10-CM

## 2024-09-19 ENCOUNTER — APPOINTMENT (OUTPATIENT)
Dept: UROLOGY | Facility: CLINIC | Age: 74
End: 2024-09-19
Payer: MEDICARE

## 2024-09-19 VITALS
TEMPERATURE: 97.7 F | HEIGHT: 65 IN | OXYGEN SATURATION: 98 % | DIASTOLIC BLOOD PRESSURE: 71 MMHG | SYSTOLIC BLOOD PRESSURE: 127 MMHG | HEART RATE: 69 BPM

## 2024-09-19 DIAGNOSIS — R39.15 URGENCY OF URINATION: ICD-10-CM

## 2024-09-19 PROCEDURE — 99213 OFFICE O/P EST LOW 20 MIN: CPT

## 2024-09-19 RX ORDER — VIBEGRON 75 MG/1
75 TABLET, FILM COATED ORAL
Qty: 90 | Refills: 3 | Status: ACTIVE | COMMUNITY
Start: 2024-09-19 | End: 1900-01-01

## 2024-09-19 NOTE — ASSESSMENT
[FreeTextEntry1] : Diagnosis History of CAP - [facility] ED - Cialis  Urgency   Plan  Annual PSA Continue Cialis  Hold solifenacin, prescribed Gemtesa for a trial for urgency   Trino Lund MD, FACS, FRCS  of Urology [facility] Director of Laparoscopic and Robotic Surgery [facility] Director of Urology, [facility] Professor of Urology   (Office) [phone] (Cell)  [phone] [email or web address]

## 2024-09-19 NOTE — HISTORY OF PRESENT ILLNESS
[FreeTextEntry1] : CC: ED, urgency, history of CAP  History of CAP, prostatectomy, RT He has ED on Cialis 20 mg  He has occasional urgency and leak Ricardo asked if any other options for urgency UUI; he has been on solifenacin.   PSA reviewed, undetectable <0.01 ng/ml 5/23 and 5/24

## 2024-10-15 ENCOUNTER — APPOINTMENT (OUTPATIENT)
Dept: INTERNAL MEDICINE | Facility: CLINIC | Age: 74
End: 2024-10-15

## 2024-10-15 ENCOUNTER — MED ADMIN CHARGE (OUTPATIENT)
Age: 74
End: 2024-10-15

## 2024-10-15 ENCOUNTER — OUTPATIENT (OUTPATIENT)
Dept: OUTPATIENT SERVICES | Facility: HOSPITAL | Age: 74
LOS: 1 days | End: 2024-10-15
Payer: MEDICARE

## 2024-10-15 DIAGNOSIS — Z98.89 OTHER SPECIFIED POSTPROCEDURAL STATES: Chronic | ICD-10-CM

## 2024-10-15 DIAGNOSIS — T14.8 OTHER INJURY OF UNSPECIFIED BODY REGION: Chronic | ICD-10-CM

## 2024-10-15 PROCEDURE — G0010: CPT

## 2024-10-15 PROCEDURE — 90746 HEPB VACCINE 3 DOSE ADULT IM: CPT

## 2024-12-05 ENCOUNTER — RX RENEWAL (OUTPATIENT)
Age: 74
End: 2024-12-05

## 2024-12-23 DIAGNOSIS — E78.00 PURE HYPERCHOLESTEROLEMIA, UNSPECIFIED: ICD-10-CM

## 2024-12-23 DIAGNOSIS — Z23 ENCOUNTER FOR IMMUNIZATION: ICD-10-CM

## 2024-12-23 DIAGNOSIS — Z00.00 ENCOUNTER FOR GENERAL ADULT MEDICAL EXAMINATION WITHOUT ABNORMAL FINDINGS: ICD-10-CM

## 2024-12-23 DIAGNOSIS — R73.09 OTHER ABNORMAL GLUCOSE: ICD-10-CM

## 2024-12-23 DIAGNOSIS — Z85.46 PERSONAL HISTORY OF MALIGNANT NEOPLASM OF PROSTATE: ICD-10-CM

## 2025-02-13 ENCOUNTER — APPOINTMENT (OUTPATIENT)
Dept: DERMATOLOGY | Facility: CLINIC | Age: 75
End: 2025-02-13

## 2025-02-13 ENCOUNTER — NON-APPOINTMENT (OUTPATIENT)
Age: 75
End: 2025-02-13

## 2025-02-13 VITALS — BODY MASS INDEX: 24.49 KG/M2 | WEIGHT: 147 LBS | HEIGHT: 65 IN

## 2025-02-13 DIAGNOSIS — L82.0 INFLAMED SEBORRHEIC KERATOSIS: ICD-10-CM

## 2025-02-13 DIAGNOSIS — Z12.83 ENCOUNTER FOR SCREENING FOR MALIGNANT NEOPLASM OF SKIN: ICD-10-CM

## 2025-02-13 DIAGNOSIS — D18.01 HEMANGIOMA OF SKIN AND SUBCUTANEOUS TISSUE: ICD-10-CM

## 2025-02-13 DIAGNOSIS — D22.9 MELANOCYTIC NEVI, UNSPECIFIED: ICD-10-CM

## 2025-02-13 DIAGNOSIS — L81.4 OTHER MELANIN HYPERPIGMENTATION: ICD-10-CM

## 2025-02-13 DIAGNOSIS — L82.1 OTHER SEBORRHEIC KERATOSIS: ICD-10-CM

## 2025-02-13 PROCEDURE — 99213 OFFICE O/P EST LOW 20 MIN: CPT | Mod: 25

## 2025-02-13 PROCEDURE — 17110 DESTRUCTION B9 LES UP TO 14: CPT

## 2025-04-30 NOTE — PRE-ANESTHESIA EVALUATION ADULT - WEIGHT IN LBS
Department of Anesthesiology  Postprocedure Note    Patient: Jame Osorio  MRN: [id number]  YOB: 1965  Date of evaluation: 4/30/2025    Procedure Summary       Date: 04/30/25 Room / Location: [facility] ENDO 02 / [facility] ENDOSCOPY    Anesthesia Start: 0813 Anesthesia Stop: 0838    Procedure: COLONOSCOPY DIAGNOSTIC (Lower GI Region) Diagnosis:       Hx of colonic polyps      (Hx of colonic polyps [Z86.0100])    Surgeons: Armin Muniz MD Responsible Provider: Chivo Arevalo MD    Anesthesia Type: MAC ASA Status: 3            Anesthesia Type: MAC    Vj Phase I: Vj Score: 10    Vj Phase II: Vj Score: 9    Anesthesia Post Evaluation    Patient location during evaluation: PACU  Patient participation: complete - patient participated  Level of consciousness: awake  Airway patency: patent  Nausea & Vomiting: no vomiting  Cardiovascular status: hemodynamically stable  Respiratory status: acceptable  Hydration status: euvolemic    No notable events documented.   134.9

## (undated) DEVICE — SUCTION YANKAUER NO CONTROL VENT

## (undated) DEVICE — TUBING IV SET GRAVITY 3Y 100" MACRO

## (undated) DEVICE — TUBING SUCTION CONN 6FT STERILE

## (undated) DEVICE — BIOPSY FORCEP RADIAL JAW 4 STANDARD WITH NEEDLE

## (undated) DEVICE — ELCTR GROUNDING PAD ADULT COVIDIEN

## (undated) DEVICE — BRUSH COLONOSCOPY CYTOLOGY

## (undated) DEVICE — CATH IV SAFE BC 22G X 1" (BLUE)

## (undated) DEVICE — PACK IV START WITH CHG

## (undated) DEVICE — SYR LUER LOK 50CC

## (undated) DEVICE — CATH IV SAFE BC 20G X 1.16" (PINK)

## (undated) DEVICE — CLAMP BX HOT RAD JAW 3

## (undated) DEVICE — IRRIGATOR BIO SHIELD

## (undated) DEVICE — FOLEY HOLDER STATLOCK 2 WAY ADULT

## (undated) DEVICE — SOL INJ NS 0.9% 500ML 2 PORT

## (undated) DEVICE — TUBING SUCTION 20FT

## (undated) DEVICE — FORCEP RADIAL JAW 4 JUMBO 2.8MM 3.2MM 240CM ORANGE DISP

## (undated) DEVICE — SENSOR O2 FINGER ADULT

## (undated) DEVICE — POLY TRAP ETRAP